# Patient Record
Sex: FEMALE | Race: WHITE | NOT HISPANIC OR LATINO | Employment: FULL TIME | URBAN - METROPOLITAN AREA
[De-identification: names, ages, dates, MRNs, and addresses within clinical notes are randomized per-mention and may not be internally consistent; named-entity substitution may affect disease eponyms.]

---

## 2017-06-18 ENCOUNTER — APPOINTMENT (OUTPATIENT)
Dept: ULTRASOUND IMAGING | Facility: CLINIC | Age: 22
End: 2017-06-18
Attending: EMERGENCY MEDICINE
Payer: COMMERCIAL

## 2017-06-18 ENCOUNTER — HOSPITAL ENCOUNTER (EMERGENCY)
Facility: CLINIC | Age: 22
Discharge: HOME | End: 2017-06-19
Attending: EMERGENCY MEDICINE | Admitting: EMERGENCY MEDICINE
Payer: COMMERCIAL

## 2017-06-18 DIAGNOSIS — O20.0 THREATENED MISCARRIAGE IN EARLY PREGNANCY: ICD-10-CM

## 2017-06-18 LAB
ABO + RH BLD: NORMAL
ABO + RH BLD: NORMAL
ANION GAP SERPL CALCULATED.3IONS-SCNC: 8 MMOL/L (ref 3–14)
B-HCG SERPL-ACNC: ABNORMAL IU/L (ref 0–5)
BLD GP AB SCN SERPL QL: NORMAL
BLOOD BANK CMNT PATIENT-IMP: NORMAL
BLOOD BANK CMNT PATIENT-IMP: NORMAL
BUN SERPL-MCNC: 6 MG/DL (ref 7–30)
CALCIUM SERPL-MCNC: 7.9 MG/DL (ref 8.5–10.1)
CHLORIDE SERPL-SCNC: 108 MMOL/L (ref 94–109)
CO2 SERPL-SCNC: 23 MMOL/L (ref 20–32)
CREAT SERPL-MCNC: 0.52 MG/DL (ref 0.52–1.04)
GFR SERPL CREATININE-BSD FRML MDRD: ABNORMAL ML/MIN/1.7M2
GLUCOSE SERPL-MCNC: 89 MG/DL (ref 70–99)
HGB BLD-MCNC: 12.7 G/DL (ref 11.7–15.7)
POTASSIUM SERPL-SCNC: 3.6 MMOL/L (ref 3.4–5.3)
SODIUM SERPL-SCNC: 139 MMOL/L (ref 133–144)
SPECIMEN EXP DATE BLD: NORMAL

## 2017-06-18 PROCEDURE — 86850 RBC ANTIBODY SCREEN: CPT | Performed by: EMERGENCY MEDICINE

## 2017-06-18 PROCEDURE — 85018 HEMOGLOBIN: CPT | Performed by: EMERGENCY MEDICINE

## 2017-06-18 PROCEDURE — 86900 BLOOD TYPING SEROLOGIC ABO: CPT | Performed by: EMERGENCY MEDICINE

## 2017-06-18 PROCEDURE — 80048 BASIC METABOLIC PNL TOTAL CA: CPT | Performed by: EMERGENCY MEDICINE

## 2017-06-18 PROCEDURE — 96361 HYDRATE IV INFUSION ADD-ON: CPT

## 2017-06-18 PROCEDURE — 96374 THER/PROPH/DIAG INJ IV PUSH: CPT

## 2017-06-18 PROCEDURE — 76801 OB US < 14 WKS SINGLE FETUS: CPT

## 2017-06-18 PROCEDURE — 25000128 H RX IP 250 OP 636: Performed by: EMERGENCY MEDICINE

## 2017-06-18 PROCEDURE — 85461 HEMOGLOBIN FETAL: CPT | Performed by: EMERGENCY MEDICINE

## 2017-06-18 PROCEDURE — 86901 BLOOD TYPING SEROLOGIC RH(D): CPT | Performed by: EMERGENCY MEDICINE

## 2017-06-18 PROCEDURE — 99285 EMERGENCY DEPT VISIT HI MDM: CPT | Mod: 25

## 2017-06-18 PROCEDURE — 84702 CHORIONIC GONADOTROPIN TEST: CPT | Performed by: EMERGENCY MEDICINE

## 2017-06-18 RX ORDER — METOCLOPRAMIDE HYDROCHLORIDE 5 MG/ML
5 INJECTION INTRAMUSCULAR; INTRAVENOUS ONCE
Status: COMPLETED | OUTPATIENT
Start: 2017-06-18 | End: 2017-06-18

## 2017-06-18 RX ORDER — SODIUM CHLORIDE 9 MG/ML
1000 INJECTION, SOLUTION INTRAVENOUS CONTINUOUS
Status: DISCONTINUED | OUTPATIENT
Start: 2017-06-18 | End: 2017-06-19 | Stop reason: HOSPADM

## 2017-06-18 RX ORDER — LIDOCAINE 40 MG/G
CREAM TOPICAL
Status: DISCONTINUED | OUTPATIENT
Start: 2017-06-18 | End: 2017-06-19 | Stop reason: HOSPADM

## 2017-06-18 RX ADMIN — METOCLOPRAMIDE 5 MG: 5 INJECTION, SOLUTION INTRAMUSCULAR; INTRAVENOUS at 22:47

## 2017-06-18 RX ADMIN — SODIUM CHLORIDE 1000 ML: 9 INJECTION, SOLUTION INTRAVENOUS at 22:45

## 2017-06-18 NOTE — ED AVS SNAPSHOT
[facility] Emergency Department    [address] [place] [address]    Phone:  [phone]    Fax:  [phone]                                       Katrina Ward   MRN: [id number]    Department:  [facility] Emergency Department   Date of Visit:  6/18/2017           Patient Information     Date Of Birth          1995        Your diagnoses for this visit were:     Threatened miscarriage in early pregnancy        You were seen by Andrey Valadez MD.        Discharge Instructions       Please see your OB in the next 3 days for a recheck.    Return to the Emergency Room if you develop more vaginal bleeding (soaking one pad an hour), lightheadedness, abdominal pain or if you have any new concerns about your health.        It was my pleasure to take care of you today. Thanks for visiting [facility]   Emergency Room.     Andrey Valadez MD    Vaginal Bleeding in Early Pregnancy:     During early pregnancy (first three months), it is not uncommon to have a small amount of bleeding. This can be entirely normal. But heavy bleeding or severe cramping can be an early sign of miscarriage. A  miscarriage  means unexpected loss of your pregnancy.  In about half of patients with bleeding or cramping during early pregnancy, these symptoms will stop and the pregnancy will continue normally. However, half of the time a miscarriage will occur. A miscarriage may occur due to various causes. These include a problem with the baby s chromosomes (genes that carry the information needed for life) or with fertilization or implantation that didn t happen correctly. In most cases no cause can be found. Be reassured that this is not the result of anything that you did wrong, and it will not interfere with your ability to become pregnant in the future    Home Care:  To improve the chance of keeping this pregnancy, you should do the following:    Rest in bed until the pain and  bleeding stop.    Do not have sexual intercourse for the next 3 weeks.    Use sanitary napkins instead of tampons.    Do not douche.  Follow-Up:  Make an appointment with your doctor within the next week, or as directed by our staff.  Note: If you had an ultrasound, it will be reviewed by a specialist. You will be notified of any new findings that may affect your care.  Get Prompt Medical Attention  if any of the following occur:    Vaginal bleeding or pain for more than three days    Heavy bleeding (soaking one new pad an hour over three hours)    Fever of 100.4 F (38 C) or higher, or as directed by your healthcare provider    Increasing lower abdominal pain    Weakness, dizziness, or fainting    Passage of anything that resembles tissue: pink or grayish membrane or solid material (save the tissue in a clean container and bring to the doctor)      24 Hour Appointment Hotline       To make an appointment at any [facility], call [phone] ([phone]). If you don't have a family doctor or clinic, we will help you find one. [facility] are conveniently located to serve the needs of you and your family.             Review of your medicines      Notice     You have not been prescribed any medications.            Procedures and tests performed during your visit     ABO/Rh type and screen    Basic metabolic panel    HCG QUANTitative pregnancy    Hemoglobin    Peripheral IV catheter    Rho (D) immune globulin (RhoGam) Lab Study    US OB < 14 Weeks w Transvaginal      Orders Needing Specimen Collection     None      Pending Results     No orders found for last 3 day(s).            Pending Culture Results     No orders found for last 3 day(s).            Pending Results Instructions     If you had any lab results that were not finalized at the time of your Discharge, you can call the ED Lab Result RN at [phone]. You will be contacted by this team for any positive Lab results or changes in treatment.  The nurses are available 7 days a week from 10A to 6:30P.  You can leave a message 24 hours per day and they will return your call.        Test Results From Your Hospital Stay        6/18/2017 10:52 PM      Component Results     Component Value Ref Range & Units Status    Hemoglobin 12.7 11.7 - 15.7 g/dL Final         6/18/2017 11:13 PM      Component Results     Component Value Ref Range & Units Status    Sodium 139 133 - 144 mmol/L Final    Potassium 3.6 3.4 - 5.3 mmol/L Final    Chloride 108 94 - 109 mmol/L Final    Carbon Dioxide 23 20 - 32 mmol/L Final    Anion Gap 8 3 - 14 mmol/L Final    Glucose 89 70 - 99 mg/dL Final    Urea Nitrogen 6 (L) 7 - 30 mg/dL Final    Creatinine 0.52 0.52 - 1.04 mg/dL Final    GFR Estimate >90  Non  GFR Calc   >60 mL/min/1.7m2 Final    GFR Estimate If Black >90   GFR Calc   >60 mL/min/1.7m2 Final    Calcium 7.9 (L) 8.5 - 10.1 mg/dL Final         6/18/2017 11:27 PM      Component Results     Component Value Ref Range & Units Status    HCG Quantitative Serum 52102 (H) 0 - 5 IU/L Final    Specimen run with a dilution         6/18/2017 10:51 PM      Component Results     Component    Rhogam Order    Order received   See Rhogam Study/Suitability           6/18/2017 11:30 PM      Component Results     Component    ABO    AB    RH(D)     Pos    Antibody Screen    Neg    Test Valid Only At    [facility]    Specimen Expires    06/21/2017 6/19/2017 12:13 AM      Narrative     US OB <14 WEEKS WITH TRANSVAGINAL SINGLE 6/19/2017 12:00 AM     INDICATION: Vaginal bleeding - evaluate for threatened miscarriage,  ectopic.    COMPARISON: None.    FINDINGS: Transabdominal followed by endovaginal ultrasound to assess  for early pregnancy. There is an intrauterine gestational sac  measuring 0.9 cm corresponding to less than 5 weeks gestational age. A  yolk sac is seen. No fetal pole is identified at this point. A fetal  pole is not routinely seen  with a gestational sac of this size. Small  corpus luteal cyst right ovary. Left ovary is negative. No free fluid  or other acute findings.        Impression     IMPRESSION:  1. Intrauterine gestational sac with yolk sac, but no visible fetal  pole. No other acute findings.  2. This may be a normal early IUP of less than 5 weeks, but fetal  demise could not be excluded at this point.  3. Recommend short-term follow-up ultrasound to confirm live IUP.    ANNABELLA LOPEZ MD                      Clinical Quality Measure: Blood Pressure Screening     Your blood pressure was checked while you were in the emergency department today. The last reading we obtained was  BP: 128/80 . Please read the guidelines below about what these numbers mean and what you should do about them.  If your systolic blood pressure (the top number) is less than 120 and your diastolic blood pressure (the bottom number) is less than 80, then your blood pressure is normal. There is nothing more that you need to do about it.  If your systolic blood pressure (the top number) is 120-139 or your diastolic blood pressure (the bottom number) is 80-89, your blood pressure may be higher than it should be. You should have your blood pressure rechecked within a year by a primary care provider.  If your systolic blood pressure (the top number) is 140 or greater or your diastolic blood pressure (the bottom number) is 90 or greater, you may have high blood pressure. High blood pressure is treatable, but if left untreated over time it can put you at risk for heart attack, stroke, or kidney failure. You should have your blood pressure rechecked by a primary care provider within the next 4 weeks.  If your provider in the emergency department today gave you specific instructions to follow-up with your doctor or provider even sooner than that, you should follow that instruction and not wait for up to 4 weeks for your follow-up visit.        Thank you for choosing  [facility]       Thank you for choosing [facility] for your care. Our goal is always to provide you with excellent care. Hearing back from our patients is one way we can continue to improve our services. Please take a few minutes to complete the written survey that you may receive in the mail after you visit with us. Thank you!        NSChart Information     [facility] gives you secure access to your electronic health record. If you see a primary care provider, you can also send messages to your care team and make appointments. If you have questions, please call your primary care clinic.  If you do not have a primary care provider, please call [phone] and they will assist you.        Care EveryWhere ID     This is your Care EveryWhere ID. This could be used by other organizations to access your [facility] medical records  [id number]        After Visit Summary       This is your record. Keep this with you and show to your community pharmacist(s) and doctor(s) at your next visit.

## 2017-06-18 NOTE — ED AVS SNAPSHOT
[facility] Emergency Department    [address]    [facility] [address]    Phone:  [phone]    Fax:  [phone]                                       Katrina Ward   MRN: [id number]    Department:  [facility] Emergency Department   Date of Visit:  6/18/2017           After Visit Summary Signature Page     I have received my discharge instructions, and my questions have been answered. I have discussed any challenges I see with this plan with the nurse or doctor.    ..........................................................................................................................................  Patient/Patient Representative Signature      ..........................................................................................................................................  Patient Representative Print Name and Relationship to Patient    ..................................................               ................................................  Date                                            Time    ..........................................................................................................................................  Reviewed by Signature/Title    ...................................................              ..............................................  Date                                                            Time

## 2017-06-19 VITALS
RESPIRATION RATE: 18 BRPM | WEIGHT: 160 LBS | HEART RATE: 85 BPM | DIASTOLIC BLOOD PRESSURE: 73 MMHG | TEMPERATURE: 97.4 F | OXYGEN SATURATION: 99 % | BODY MASS INDEX: 29.44 KG/M2 | SYSTOLIC BLOOD PRESSURE: 126 MMHG | HEIGHT: 62 IN

## 2017-06-19 NOTE — ED PROVIDER NOTES
"[facility] Emergency Medicine Note:    History     Chief Complaint:  Vaginal Bleeding      HPI: Katrina Ward is a 21 year old female who presents for evaluation of the above.  She notes she had vaginal bleeding starting today.  She has is currently pregnant around 6 weeks she believes.  The patient notes she is had intermittent lower abdominal pain but this is mild.  The vomitus without blood she is had around one hour of vomiting and then it has ceased there's been no diarrhea.  She has not had an ultrasound yet this pregnancy.  There've been no fevers chills, dysuria, increased frequency of urination or other new symptoms.      Allergies:  No Known Allergies    Medications:      No current outpatient prescriptions on file.    Problem List:    Patient Active Problem List    Diagnosis Date Noted     Indication for care in labor or delivery 07/01/2015       Past Medical History:    Past Medical History:   Diagnosis Date     Pre-eclampsia      Scoliosis      Substance abuse        Past Surgical History:    No past surgical history on file.    Family History:    No family history on file.    Social History:  Social History   Substance Use Topics     Smoking status: Current Some Day Smoker     Smokeless tobacco: Never Used     Alcohol use Yes       Review of Systems  See HPI, a 10 point review of systems was performed and is otherwise negative except as noted in HPI.     Physical Exam   Vital signs:  Patient Vitals for the past 24 hrs:   BP Temp Temp src Pulse Resp SpO2 Height Weight   06/18/17 2152 128/80 97.4  F (36.3  C) Temporal 85 18 99 % 1.575 m (5' 2\") 72.6 kg (160 lb)       Physical Exam  General: Patient is alert and interactive when I enter the room  Head:  The scalp, face, and head appear normal  Eyes:  The pupils are equal, round, and reactive to light    Conjunctivae and sclerae are normal  ENT:    External acoustic canals are normal    The oropharynx is normal without erythema.     Uvula is in the " midline  Neck:  Normal range of motion  CV:  Regular rate. S1/S2. No murmurs.   Resp:  Lungs are clear without wheezes or rales. No distress  GI:  Abdomen is soft, no rigidity, guarding, or rebound    No distension. No tenderness to palpation in any quadrant.     MS:  Normal tone. Joints grossly normal without effusions.     No asymmetric leg swelling, calf or thigh tenderness.      Normal motor assessment of all extremities.  Skin:  No rash or lesions noted. Normal capillary refill noted  Neuro:  Speech is normal and fluent. Face is symmetric.     Moving all extremities well.   Psych:  Awake. Alert.  Normal affect.  Appropriate interactions.  Lymph: No anterior cervical lymphadenopathy noted        Emergency Department Course   Imaging:  US OB < 14 Weeks w Transvaginal   Final Result   IMPRESSION:   1. Intrauterine gestational sac with yolk sac, but no visible fetal   pole. No other acute findings.   2. This may be a normal early IUP of less than 5 weeks, but fetal   demise could not be excluded at this point.   3. Recommend short-term follow-up ultrasound to confirm live IUP.      ANNABELLA LOPEZ MD          Laboratory:  HCG Qualitative Pregnancy : 37057  ABO/Rh type and screen: AB Pos, Antibody Screen Neg.  BMP: BUN 6, Calcium 7.9 o/w WNL. (Creatinine 0.52)  Hemoglobin  Peripheral IV catheter  Rhogam order    Interventions:  22:45 Normal saline 1,000mL IV   22:47 Reglan 5 mg IV  Medications   Blood Bank will determine if patient is eligible for and the proper dosage of Rho (D) immune globulin (RhoGam) (not administered)   lidocaine 1 % 1 mL (not administered)   lidocaine (LMX4) kit (not administered)   sodium chloride (PF) 0.9% PF flush 3 mL (not administered)   sodium chloride (PF) 0.9% PF flush 3 mL (not administered)   0.9% sodium chloride BOLUS (0 mLs Intravenous Stopped 6/19/17 0007)     Followed by   0.9% sodium chloride infusion (not administered)   NO Rho (D)  immune globulin (RhoGam) needed  - mother  INELIGIBLE (not administered)   NO Rho (D)  immune globulin (RhoGam) needed  - mother INELIGIBLE (not administered)   metoclopramide (REGLAN) injection 5 mg (5 mg Intravenous Given 6/18/17 6857)     Emergency Department Course:  Nursing notes and vitals reviewed.  I performed an exam of the patient as documented above.   Blood drawn. This was sent to the lab for further testing, results above.  The patient was taken for ultrasound, see imaging results above.   The patient received the intervention(s) above.  Recheck patient. Findings and plan explained to the Patient. Patient discharged home with instructions regarding supportive care, medications, and reasons to return. The importance of close follow-up was reviewed.  See above for meds/radiology/procedures intervention    Impression & Plan    Medical Decision Making:  Katrina Ward is a 21 year old female who presents for evaluation of vaginal spotting.  The workup here shows threatened miscarriage.  Her quant is high enough that I would expect to see fetal pole so I am suspicious about miscarriage. There is not a subchorionic hemorrhage.   I considered a broad differential including ovarian cyst, UTI, pyelonephritis, subchorionic hemorrhage, uterine bleeding, active miscarriage, constipation, etc.  More rare but serious etiologies considered included ectopic pregnancy, appendicitis, cholecystitis, volvulus, intraabdominal abscess, heterotopic pregnancy, etc.  In this patient, there are no signs of serious etiologies of abdominal pain.  Supportive outpatient management is therefore indicated.  Plan is home, close follow-up with OB, threatened miscarriage precautions, and return to ED for worsening pain, heavy vaginal bleeding (more than 1 pad soaked every hour).  Questions were answered.        Diagnosis:    ICD-10-CM    1. Threatened miscarriage in early pregnancy O20.0        Disposition:  discharged to home    Discharge Medications:  New Prescriptions    No  medications on file         Andrey Valadez MD  4/1/2017   [facility] EMERGENCY DEPARTMENT         Andrey Valadez MD  06/19/17 0034

## 2017-06-19 NOTE — ED NOTES
Pt presents to ED for evaluation of vomiting, dizziness and one episode of vaginal bleeding that has since resolved.  Pt states she is pregnant, but just found out and is unsure how far along she is in the pregnancy.

## 2017-06-19 NOTE — DISCHARGE INSTRUCTIONS
Please see your OB in the next 3 days for a recheck.    Return to the Emergency Room if you develop more vaginal bleeding (soaking one pad an hour), lightheadedness, abdominal pain or if you have any new concerns about your health.        It was my pleasure to take care of you today. Thanks for visiting [facility]   Emergency Room.     Andrey Valadez MD    Vaginal Bleeding in Early Pregnancy:     During early pregnancy (first three months), it is not uncommon to have a small amount of bleeding. This can be entirely normal. But heavy bleeding or severe cramping can be an early sign of miscarriage. A  miscarriage  means unexpected loss of your pregnancy.  In about half of patients with bleeding or cramping during early pregnancy, these symptoms will stop and the pregnancy will continue normally. However, half of the time a miscarriage will occur. A miscarriage may occur due to various causes. These include a problem with the baby s chromosomes (genes that carry the information needed for life) or with fertilization or implantation that didn t happen correctly. In most cases no cause can be found. Be reassured that this is not the result of anything that you did wrong, and it will not interfere with your ability to become pregnant in the future    Home Care:  To improve the chance of keeping this pregnancy, you should do the following:    Rest in bed until the pain and bleeding stop.    Do not have sexual intercourse for the next 3 weeks.    Use sanitary napkins instead of tampons.    Do not douche.  Follow-Up:  Make an appointment with your doctor within the next week, or as directed by our staff.  Note: If you had an ultrasound, it will be reviewed by a specialist. You will be notified of any new findings that may affect your care.  Get Prompt Medical Attention  if any of the following occur:    Vaginal bleeding or pain for more than three days    Heavy bleeding (soaking one new pad an hour over three  hours)    Fever of 100.4 F (38 C) or higher, or as directed by your healthcare provider    Increasing lower abdominal pain    Weakness, dizziness, or fainting    Passage of anything that resembles tissue: pink or grayish membrane or solid material (save the tissue in a clean container and bring to the doctor)

## 2017-08-16 ENCOUNTER — HOSPITAL ENCOUNTER (EMERGENCY)
Facility: CLINIC | Age: 22
Discharge: HOME | End: 2017-08-16
Attending: EMERGENCY MEDICINE | Admitting: EMERGENCY MEDICINE
Payer: COMMERCIAL

## 2017-08-16 ENCOUNTER — APPOINTMENT (OUTPATIENT)
Dept: ULTRASOUND IMAGING | Facility: CLINIC | Age: 22
End: 2017-08-16
Attending: EMERGENCY MEDICINE
Payer: COMMERCIAL

## 2017-08-16 VITALS
OXYGEN SATURATION: 100 % | HEART RATE: 86 BPM | SYSTOLIC BLOOD PRESSURE: 118 MMHG | DIASTOLIC BLOOD PRESSURE: 87 MMHG | RESPIRATION RATE: 20 BRPM | TEMPERATURE: 98.5 F

## 2017-08-16 DIAGNOSIS — N30.00 ACUTE CYSTITIS WITHOUT HEMATURIA: ICD-10-CM

## 2017-08-16 DIAGNOSIS — O20.0 THREATENED ABORTION: ICD-10-CM

## 2017-08-16 LAB
ALBUMIN UR-MCNC: NEGATIVE MG/DL
ANION GAP SERPL CALCULATED.3IONS-SCNC: 8 MMOL/L (ref 3–14)
APPEARANCE UR: ABNORMAL
B-HCG SERPL-ACNC: ABNORMAL IU/L (ref 0–5)
BACTERIA #/AREA URNS HPF: ABNORMAL /HPF
BASOPHILS # BLD AUTO: 0 10E9/L (ref 0–0.2)
BASOPHILS NFR BLD AUTO: 0.1 %
BILIRUB UR QL STRIP: NEGATIVE
BUN SERPL-MCNC: 5 MG/DL (ref 7–30)
CALCIUM SERPL-MCNC: 8.1 MG/DL (ref 8.5–10.1)
CHLORIDE SERPL-SCNC: 108 MMOL/L (ref 94–109)
CO2 SERPL-SCNC: 23 MMOL/L (ref 20–32)
COLOR UR AUTO: YELLOW
CREAT SERPL-MCNC: 0.5 MG/DL (ref 0.52–1.04)
DIFFERENTIAL METHOD BLD: NORMAL
EOSINOPHIL # BLD AUTO: 0 10E9/L (ref 0–0.7)
EOSINOPHIL NFR BLD AUTO: 0.4 %
ERYTHROCYTE [DISTWIDTH] IN BLOOD BY AUTOMATED COUNT: 13 % (ref 10–15)
GFR SERPL CREATININE-BSD FRML MDRD: >90 ML/MIN/1.7M2
GLUCOSE SERPL-MCNC: 91 MG/DL (ref 70–99)
GLUCOSE UR STRIP-MCNC: NEGATIVE MG/DL
HCT VFR BLD AUTO: 36 % (ref 35–47)
HGB BLD-MCNC: 12.4 G/DL (ref 11.7–15.7)
HGB UR QL STRIP: NEGATIVE
IMM GRANULOCYTES # BLD: 0.1 10E9/L (ref 0–0.4)
IMM GRANULOCYTES NFR BLD: 0.6 %
KETONES UR STRIP-MCNC: NEGATIVE MG/DL
LEUKOCYTE ESTERASE UR QL STRIP: ABNORMAL
LYMPHOCYTES # BLD AUTO: 1.7 10E9/L (ref 0.8–5.3)
LYMPHOCYTES NFR BLD AUTO: 19.6 %
MCH RBC QN AUTO: 31.7 PG (ref 26.5–33)
MCHC RBC AUTO-ENTMCNC: 34.4 G/DL (ref 31.5–36.5)
MCV RBC AUTO: 92 FL (ref 78–100)
MONOCYTES # BLD AUTO: 0.7 10E9/L (ref 0–1.3)
MONOCYTES NFR BLD AUTO: 8 %
MUCOUS THREADS #/AREA URNS LPF: PRESENT /LPF
NEUTROPHILS # BLD AUTO: 6.3 10E9/L (ref 1.6–8.3)
NEUTROPHILS NFR BLD AUTO: 71.3 %
NITRATE UR QL: NEGATIVE
NRBC # BLD AUTO: 0 10*3/UL
NRBC BLD AUTO-RTO: 0 /100
PH UR STRIP: 7 PH (ref 5–7)
PLATELET # BLD AUTO: 204 10E9/L (ref 150–450)
POTASSIUM SERPL-SCNC: 3.6 MMOL/L (ref 3.4–5.3)
RBC # BLD AUTO: 3.91 10E12/L (ref 3.8–5.2)
RBC #/AREA URNS AUTO: 3 /HPF (ref 0–2)
SODIUM SERPL-SCNC: 139 MMOL/L (ref 133–144)
SOURCE: ABNORMAL
SP GR UR STRIP: 1.02 (ref 1–1.03)
SQUAMOUS #/AREA URNS AUTO: 14 /HPF (ref 0–1)
UROBILINOGEN UR STRIP-MCNC: 0 MG/DL (ref 0–2)
WBC # BLD AUTO: 8.9 10E9/L (ref 4–11)
WBC #/AREA URNS AUTO: 22 /HPF (ref 0–2)

## 2017-08-16 PROCEDURE — 99284 EMERGENCY DEPT VISIT MOD MDM: CPT | Mod: 25

## 2017-08-16 PROCEDURE — 85025 COMPLETE CBC W/AUTO DIFF WBC: CPT | Performed by: EMERGENCY MEDICINE

## 2017-08-16 PROCEDURE — 84702 CHORIONIC GONADOTROPIN TEST: CPT | Performed by: EMERGENCY MEDICINE

## 2017-08-16 PROCEDURE — 81001 URINALYSIS AUTO W/SCOPE: CPT | Performed by: EMERGENCY MEDICINE

## 2017-08-16 PROCEDURE — 80048 BASIC METABOLIC PNL TOTAL CA: CPT | Performed by: EMERGENCY MEDICINE

## 2017-08-16 PROCEDURE — 76815 OB US LIMITED FETUS(S): CPT

## 2017-08-16 RX ORDER — CEPHALEXIN 500 MG/1
500 CAPSULE ORAL 4 TIMES DAILY
Qty: 28 CAPSULE | Refills: 0 | Status: SHIPPED | OUTPATIENT
Start: 2017-08-16 | End: 2017-08-23

## 2017-08-16 ASSESSMENT — ENCOUNTER SYMPTOMS: FREQUENCY: 1

## 2017-08-16 NOTE — ED AVS SNAPSHOT
[facility] Emergency Department    [address]    [facility] [address]    Phone:  [phone]    Fax:  [phone]                                       Katrina Ward   MRN: [id number]    Department:  [facility] Emergency Department   Date of Visit:  2017           Patient Information     Date Of Birth          1995        Your diagnoses for this visit were:     Threatened      Acute cystitis without hematuria        You were seen by Bennie Quintanilla MD.      Follow-up Information     Follow up with ob/gyn.    Why:  for re-evaluation of your symptoms        Discharge Instructions       Discharge Instructions  Urinary Tract Infection  You or your child have been diagnosed with a urinary tract infection, or UTI. The urinary tract includes the kidneys (which make urine/pee), ureters (the tubes that carry urine/pee from the kidneys to the bladder), the bladder (which stores urine/pee), and urethra (the tube that carries urine/pee out of the bladder). Urinary tract infections occur when bacteria travel up the urethra into the bladder (bladder infection) and, in some cases, from there into the kidneys (kidney infection).  Generally, every Emergency Department visit should have a follow-up clinic visit with either a primary or a specialty clinic/provider. Please follow-up as instructed by your emergency provider today.  Return to the Emergency Department if:    You or your child have severe back pain.    You or your child are vomiting (throwing up) so that you cannot take your medicine.    You or your child have a new fever (had not previously had a fever) over 101 F.    You or your child have confusion or are very weak, or feel very ill.    Your child seems much more ill, will not wake up, will not respond right, or is crying for a long time and will not calm down.    You or your child are showing signs of dehydration. These signs may include decreased  urination (pee), dry mouth/gums/tongue, or decreased activity.    Follow-up with your provider:     Children under 24 months need to be seen by their regular provider within one week after a diagnosis of a UTI. It may be necessary to do some more tests to look at the child s kidney or bladder.    You should begin to feel better within 24 - 48 hours of starting your antibiotic; follow-up with your regular clinic/doctor/provider if this is not the case.    Treatment:     You will be treated with an antibiotic to kill the bacteria. We have to make an educated guess, based on what we know about common bacteria and antibiotics, as to which antibiotic will work for your infection. We will be correct most times but there will be some cases where the antibiotic chosen is not correct (see urine cultures below).    Take a pain medication such as acetaminophen (Tylenol ) or ibuprofen (Advil , Motrin , Nuprin ).    Phenazopyridine (Pyridium , Uristat ) is a prescription medication that numbs the bladder to reduce the burning pain of some UTIs.  The same medication is available in a non-prescription version (Azo-Standard , Urodol ). This medication will change the color of the urine and tears (usually blue or orange). If you wear contacts, do not wear them while taking this medication as they may be stained by the medication.    Urine Cultures:    If indicated, a urine culture may have been performed today. This test generally takes 24-48 hours to complete so the results are not known at this time. The results can confirm that an infection is present but also determine which antibiotic is effective for the specific bacteria that is causing the infection. If your urine culture shows that the antibiotic you were given today will not work to treat your infection, we will attempt to contact you to make arrangements to change the antibiotic. If the culture confirms that the antibiotic is effective for your infection, you will not be  "contacted. We often recommend follow-up with your regular physician/provider on the culture results regardless of this process.    Antibiotic Warning:     If you have been placed on antibiotics - watch for signs of allergic reaction.  These include rash, lip swelling, difficulty breathing, wheezing, and dizziness.  If you develop any of these symptoms, stop the antibiotic immediately and go to an emergency room or urgent care for evaluation.    Probiotics: If you have been given an antibiotic, you may want to also take a probiotic pill or eat yogurt with live cultures. Probiotics have \"good bacteria\" to help your intestines stay healthy. Studies have shown that probiotics help prevent diarrhea and other intestine problems (including C. diff infection) when you take antibiotics. You can buy these without a prescription in the pharmacy section of the store.   If you were given a prescription for medicine here today, be sure to read all of the information (including the package insert) that comes with your prescription.  This will include important information about the medicine, its side effects, and any warnings that you need to know about.  The pharmacist who fills the prescription can provide more information and answer questions you may have about the medicine.  If you have questions or concerns that the pharmacist cannot address, please call or return to the Emergency Department.   Remember that you can always come back to the Emergency Department if you are not able to see your regular provider in the amount of time listed above, if you get any new symptoms, or if there is anything that worries you.      Possible Miscarriage (Threatened )  You may be having a miscarriage.  Common signs of a miscarriage are pain and bleeding. A small amount of bleeding can be normal during the first 3 months of pregnancy. Often the pain and bleeding stop, and you have a normal pregnancy and baby. But heavy bleeding or " severe cramping can be an early sign of miscarriage. A miscarriage means an unexpected loss of your pregnancy.  At this time, your healthcare provider doesn t know whether you will have a miscarriage, or if things will clear up and your pregnancy will continue normally. This can be emotionally difficult. There is little that can be done to change the way you feel. But understand that miscarriages are common.  About 1 or 2 out of every 10 pregnancies end this way. Some even end before you know you are pregnant. This happens for a number of reasons, and usually the cause is never known. It s important you know that it is not your fault. It didn t happen because you did anything wrong.  Having sex or exercising does not cause a miscarriage. These activities are usually safe unless you have pain or bleeding or your doctor tells you to stop. Even minor falls won t cause a miscarriage. Miscarriages happen because things were not developing as they were supposed to. No medicine can prevent a miscarriage.  Again, understand that things are uncertain right now. You may still have some bleeding. This may be light spotting or like a period, and you may pass some tissue. You may have some cramping. This is why follow-up care is important.  Home care  To improve the chance of keeping your pregnancy, you should take these steps:    Rest in bed until the pain and bleeding stop.    Don t have sex until your healthcare provider says it s OK.    Use sanitary napkins instead of tampons.    Don t douche.    Don t take aspirin, ibuprofen, or naproxen.    Don t have alcoholic or caffeinated beverages or smoke.  Follow-up care  Make an appointment with your doctor within the next week, or as directed.  If you had an ultrasound, a radiologist will review it. You will be told of any new findings that may affect your care.  Call 911  Call 911 if you have:    Severe pain and very heavy bleeding    Severe lightheadedness, passing out, or  fainting    Rapid heart rate    Difficulty breathing    Confusion or difficulty waking up  When to seek medical advice  Call your healthcare provider right away if any of these occur:    Vaginal bleeding or pain that lasts for more than 3 days    Heavy bleeding. This means soaking 1 new pad an hour over 3 hours.    Fever of 100.4 F (38 C) or higher, or as directed by your healthcare provider    Pain in your lower belly (abdomen) that gets worse    Weakness or dizziness    Passage of anything that resembles tissue. This would be pink or grayish membrane or solid material. Save the tissue in a clean container and bring it to your provider.  Date Last Reviewed: 9/1/2016 2000-2017 [email or web address]. [address] [place] [address], [place] [address]. All rights reserved. This information is not intended as a substitute for professional medical care. Always follow your healthcare professional's instructions.          24 Hour Appointment Hotline       To make an appointment at any [facility], call [phone] ([phone]). If you don't have a family doctor or clinic, we will help you find one. [facility] clinics are conveniently located to serve the needs of you and your family.             Review of your medicines      START taking        Dose / Directions Last dose taken    cephALEXin 500 MG capsule   Commonly known as:  KEFLEX   Dose:  500 mg   Quantity:  28 capsule        Take 1 capsule (500 mg) by mouth 4 times daily for 7 days   Refills:  0                Prescriptions were sent or printed at these locations (1 Prescription)                   Other Prescriptions                Printed at Department/Unit printer (1 of 1)         cephALEXin (KEFLEX) 500 MG capsule                Procedures and tests performed during your visit     Basic metabolic panel    CBC with platelets differential    HCG QUANTitative pregnancy (blood)    Peripheral IV: Standard    UA with Microscopic    US OB Limited One Or More  Fetuses      Orders Needing Specimen Collection     None      Pending Results     Date and Time Order Name Status Description    8/16/2017 0155 US OB Limited One Or More Fetuses Preliminary             Pending Culture Results     No orders found from 8/14/2017 to 8/17/2017.            Pending Results Instructions     If you had any lab results that were not finalized at the time of your Discharge, you can call the ED Lab Result RN at [phone]. You will be contacted by this team for any positive Lab results or changes in treatment. The nurses are available 7 days a week from 10A to 6:30P.  You can leave a message 24 hours per day and they will return your call.        Test Results From Your Hospital Stay              8/16/2017  2:36 AM      Component Results     Component Value Ref Range & Units Status    WBC 8.9 4.0 - 11.0 10e9/L Final    RBC Count 3.91 3.8 - 5.2 10e12/L Final    Hemoglobin 12.4 11.7 - 15.7 g/dL Final    Hematocrit 36.0 35.0 - 47.0 % Final    MCV 92 78 - 100 fl Final    MCH 31.7 26.5 - 33.0 pg Final    MCHC 34.4 31.5 - 36.5 g/dL Final    RDW 13.0 10.0 - 15.0 % Final    Platelet Count 204 150 - 450 10e9/L Final    Diff Method Automated Method  Final    % Neutrophils 71.3 % Final    % Lymphocytes 19.6 % Final    % Monocytes 8.0 % Final    % Eosinophils 0.4 % Final    % Basophils 0.1 % Final    % Immature Granulocytes 0.6 % Final    Nucleated RBCs 0 0 /100 Final    Absolute Neutrophil 6.3 1.6 - 8.3 10e9/L Final    Absolute Lymphocytes 1.7 0.8 - 5.3 10e9/L Final    Absolute Monocytes 0.7 0.0 - 1.3 10e9/L Final    Absolute Eosinophils 0.0 0.0 - 0.7 10e9/L Final    Absolute Basophils 0.0 0.0 - 0.2 10e9/L Final    Abs Immature Granulocytes 0.1 0 - 0.4 10e9/L Final    Absolute Nucleated RBC 0.0  Final         8/16/2017  2:58 AM      Component Results     Component Value Ref Range & Units Status    Sodium 139 133 - 144 mmol/L Final    Potassium 3.6 3.4 - 5.3 mmol/L Final    Chloride 108 94 - 109 mmol/L  Final    Carbon Dioxide 23 20 - 32 mmol/L Final    Anion Gap 8 3 - 14 mmol/L Final    Glucose 91 70 - 99 mg/dL Final    Urea Nitrogen 5 (L) 7 - 30 mg/dL Final    Creatinine 0.50 (L) 0.52 - 1.04 mg/dL Final    GFR Estimate >90 >60 mL/min/1.7m2 Final    Non  GFR Calc    GFR Estimate If Black >90 >60 mL/min/1.7m2 Final    African American GFR Calc    Calcium 8.1 (L) 8.5 - 10.1 mg/dL Final         8/16/2017  3:25 AM      Component Results     Component Value Ref Range & Units Status    HCG Quantitative Serum 48775 (H) 0 - 5 IU/L Final    Specimen run with a dilution         8/16/2017  2:43 AM      Component Results     Component Value Ref Range & Units Status    Color Urine Yellow  Final    Appearance Urine Slightly Cloudy  Final    Glucose Urine Negative NEG^Negative mg/dL Final    Bilirubin Urine Negative NEG^Negative Final    Ketones Urine Negative NEG^Negative mg/dL Final    Specific Gravity Urine 1.017 1.003 - 1.035 Final    Blood Urine Negative NEG^Negative Final    pH Urine 7.0 5.0 - 7.0 pH Final    Protein Albumin Urine Negative NEG^Negative mg/dL Final    Urobilinogen mg/dL 0.0 0.0 - 2.0 mg/dL Final    Nitrite Urine Negative NEG^Negative Final    Leukocyte Esterase Urine Small (A) NEG^Negative Final    Source Midstream Urine  Final    WBC Urine 22 (H) 0 - 2 /HPF Final    RBC Urine 3 (H) 0 - 2 /HPF Final    Bacteria Urine Many (A) NEG^Negative /HPF Final    Squamous Epithelial /HPF Urine 14 (H) 0 - 1 /HPF Final    Mucous Urine Present (A) NEG^Negative /LPF Final         8/16/2017  3:46 AM      Narrative     US OB LIMITED ONE OR MORE FETUSES  8/16/2017 3:35 AM    HISTORY: Pregnancy with pain or bleeding.    COMPARISON: 6/18/2017.    FINDINGS:   Presentation: Breech.  Cardiac activity: 165 bpm. Regular rhythm.  Movement: Unremarkable.  Placenta: Anterior. No evidence for placenta previa.  Adnexa: Unremarkable.   Cervical length: 3.4 cm.   Amniotic fluid: Unremarkable.     Other findings: None.  A  complete anatomy scan was not performed.     Measured parameters:       BPD: 2.6 cm  Age: 14 weeks 5 days.       HC: 9.8 cm  Age: 14 weeks 4 days.       AC: 8.0 cm  Age: 14 weeks 3 days.       FL:   1.5 cm  Age: 14 weeks 3 days.    Gestational age by current ultrasound measurement: 14 weeks 5 days,  corresponding to an PATRICIA of 2/9/2018.    Estimated fetal weight: 97 grams.        Impression     IMPRESSION: Single live intrauterine pregnancy measuring 14 weeks 5  days gestational age. No abnormalities are evident.                Clinical Quality Measure: Blood Pressure Screening     Your blood pressure was checked while you were in the emergency department today. The last reading we obtained was  BP: 118/87 . Please read the guidelines below about what these numbers mean and what you should do about them.  If your systolic blood pressure (the top number) is less than 120 and your diastolic blood pressure (the bottom number) is less than 80, then your blood pressure is normal. There is nothing more that you need to do about it.  If your systolic blood pressure (the top number) is 120-139 or your diastolic blood pressure (the bottom number) is 80-89, your blood pressure may be higher than it should be. You should have your blood pressure rechecked within a year by a primary care provider.  If your systolic blood pressure (the top number) is 140 or greater or your diastolic blood pressure (the bottom number) is 90 or greater, you may have high blood pressure. High blood pressure is treatable, but if left untreated over time it can put you at risk for heart attack, stroke, or kidney failure. You should have your blood pressure rechecked by a primary care provider within the next 4 weeks.  If your provider in the emergency department today gave you specific instructions to follow-up with your doctor or provider even sooner than that, you should follow that instruction and not wait for up to 4 weeks for your follow-up  visit.        Thank you for choosing [facility]       Thank you for choosing [facility] for your care. Our goal is always to provide you with excellent care. Hearing back from our patients is one way we can continue to improve our services. Please take a few minutes to complete the written survey that you may receive in the mail after you visit with us. Thank you!        [facility] Information     [facility] gives you secure access to your electronic health record. If you see a primary care provider, you can also send messages to your care team and make appointments. If you have questions, please call your primary care clinic.  If you do not have a primary care provider, please call [phone] and they will assist you.        Care EveryWhere ID     This is your Care EveryWhere ID. This could be used by other organizations to access your [facility] medical records  [id number]        Equal Access to Services     COLE MOORE : Estefany Guy, tony stark, arlen berg. So [facility] [phone].    ATENCIÓN: Si habla español, tiene a mckinley disposición servicios gratuitos de asistencia lingüística. Llame al [phone].    We comply with applicable federal civil rights laws and [place] laws. We do not discriminate on the basis of race, color, national origin, age, disability sex, sexual orientation or gender identity.            After Visit Summary       This is your record. Keep this with you and show to your community pharmacist(s) and doctor(s) at your next visit.

## 2017-08-16 NOTE — ED NOTES
"Patient presents complaining of vaginal bleeding and cramping. She states she is pregnant \"I think about 4 months pregnant\", but has not received any prenatal care up to this point. She is alert and oriented, ABCs intact.  "

## 2017-08-16 NOTE — ED PROVIDER NOTES
"  History     Chief Complaint:  Vaginal Bleeding    HPI   Ktarina Ward is a  21 year old female who presents to the emergency department today for evaluation of vaginal bleeding. Per chart review, the patient was seen in the emergency department 2 months ago and was found to be pregnant. See ultrasound results below. After returning home, she had vaginal bleeding with clotting and \"assumed it was a full on miscarriage.\" A week ago, she thought she might be pregnant again so she took an at home pregnancy test that came back positive. Yesterday, the patient had cramping and today developed vaginal bleeding with clotting. Therefore she presents to the emergency department for evaluation of pregnancy status. An additional concern of the patient is a bladder infection due to decreased urine volume during urination as well as increase frequency. She denies urgency, but states she \"gets bladder infections a lot.\" Of note, the patient never followed up with OB following her  visit to the emergency department, but she has an appointment with OB on the  at [facility].    US OB <14 weeks with Transvaginal Single  IMPRESSION:  1. Intrauterine gestational sac with yolk sac, but no visible fetal  pole. No other acute findings.  2. This may be a normal early IUP of less than 5 weeks, but fetal  demise could not be excluded at this point.  3. Recommend short-term follow-up ultrasound to confirm live IUP.  Report per radiology     Allergies:  No Known Drug Allergies     Medications:    The patient is currently on no regular medications.    Past Medical History:    Pre-eclampsia  Scoliosis    Past Surgical History:    History reviewed. No pertinent past surgical history.    Family History:    History reviewed. No pertinent family history.     Social History:  The patient was accompanied to the ED by her family.  Smoking Status: Current  Smokeless Tobacco: Never  Alcohol Use: Yes  Marital Status:  Single   "   Review of Systems   Genitourinary: Positive for decreased urine volume, frequency and vaginal bleeding (with clotting). Negative for urgency.   All other systems reviewed and are negative.    Physical Exam   First Vitals:  /87  Pulse 86  Temp 98.5  F (36.9  C) (Temporal)  Resp 20  LMP 04/01/2017  SpO2 100%      Physical Exam  General: Patient is alert and cooperative.  HENT: normal nose, oropharynx.   Eyes: Normal conjunctiva.  Neck: Normal range of motion. Neck supple.  Cardiovascular: Normal rate.  Pulmonary/Chest: Effort normal.   Abdominal: Soft. Patient exhibits no distension and no mass. There is no tenderness.       Musculoskeletal: Normal range of motion. Patient exhibits no edema and no tenderness.   Neurological: Patient  is alert and oriented.  Skin: Skin is warm and dry. No rash noted.   Psychiatric: Patient has a normal mood and affect. Normal behavior and judgement.    Emergency Department Course     Imaging:  Radiology findings were communicated with the patient and family who voiced understanding of the findings.  US OB Limited One or More Fetuses  IMPRESSION: Single live intrauterine pregnancy measuring 14 weeks 5  days gestational age. No abnormalities are evident.  Report per radiology     Laboratory:  Laboratory findings were communicated with the patient and family who voiced understanding of the findings.  CBC: AWNL. (WBC 8.9, HGB 12.4, )   BMP: Creatinine 0.50 (L), BUN 5 (L), Calcium 8.1 (L), o/w WNL  HCG Quantitative pregnancy (blood): 67743 (H)  UA with Microscopic: Leukocyte Esterase Urine Small, WBC/HPF 22 (H), RBC/HPF 3(H), Bacteria Many, Squamous Epithelial /HPF Urine 14 (H), Mucous Urine Present, o/w WNL    Emergency Department Course:  Nursing notes and vitals reviewed.  IV was inserted and blood was drawn for laboratory testing, results above.  The patient was sent for a US OB Limited One Or More Fetuses while in the emergency department, results above.   The  patient provided a urine sample here in the emergency department. This was sent for laboratory testing, findings above.  0155: I performed an exam of the patient as documented above.  0440: Patient rechecked and updated.    I discussed the treatment plan with the patient. They expressed understanding of this plan and consented to discharge. They will be discharged home with instructions for care and follow up. In addition, the patient will return to the emergency department if their symptoms persist, worsen, if new symptoms arise or if there is any concern.  All questions were answered.  I personally reviewed the laboratory and imaging results with the Patient and family and answered all related questions prior to discharge.    Impression & Plan      Medical Decision Making:  Katrina Ward is an afebrile 21 year old female with acute abdominal cramping and vaginal spotting. She was seen in this ER in  with similar issues, was found to be pregnant and had pelvic ultrasound demonstrating an early IUP. She has not follow up with OBGYN since that time. She has a benign abdominal exam and workup this morning, which has included pelvic ultrasound showing an IUP at 14 weeks 5 days. There is no abnormality described. She has a normal CBC. Urinalysis showing pyuria and bacteremia. She is complaining of some very vague urine symptoms of frequency. She has no fevers or flank pain. Clinically, I've recommended treatment for acute cystitis and early OBGYN follow up. There is no indication for any additional testing at this time.    Diagnosis:    ICD-10-CM    1. Threatened  O20.0    2. Acute cystitis without hematuria N30.00      Disposition:  Discharged to home    Discharge Medications:  Discharge Medication List as of 2017  4:43 AM      START taking these medications    Details   cephALEXin (KEFLEX) 500 MG capsule Take 1 capsule (500 mg) by mouth 4 times daily for 7 days, Disp-28 capsule, R-0, Local Print            Scribe Disclosure:  I, Lo Simpson, am serving as a scribe at 1:55 AM on 8/16/2017 to document services personally performed by Bennie Quintanilla MD based on my observations and the provider's statements to me.     8/16/2017   [facility] EMERGENCY DEPARTMENT       Bennie Quintanilla MD  08/16/17 0236

## 2017-08-16 NOTE — DISCHARGE INSTRUCTIONS
Discharge Instructions  Urinary Tract Infection  You or your child have been diagnosed with a urinary tract infection, or UTI. The urinary tract includes the kidneys (which make urine/pee), ureters (the tubes that carry urine/pee from the kidneys to the bladder), the bladder (which stores urine/pee), and urethra (the tube that carries urine/pee out of the bladder). Urinary tract infections occur when bacteria travel up the urethra into the bladder (bladder infection) and, in some cases, from there into the kidneys (kidney infection).  Generally, every Emergency Department visit should have a follow-up clinic visit with either a primary or a specialty clinic/provider. Please follow-up as instructed by your emergency provider today.  Return to the Emergency Department if:    You or your child have severe back pain.    You or your child are vomiting (throwing up) so that you cannot take your medicine.    You or your child have a new fever (had not previously had a fever) over 101 F.    You or your child have confusion or are very weak, or feel very ill.    Your child seems much more ill, will not wake up, will not respond right, or is crying for a long time and will not calm down.    You or your child are showing signs of dehydration. These signs may include decreased urination (pee), dry mouth/gums/tongue, or decreased activity.    Follow-up with your provider:     Children under 24 months need to be seen by their regular provider within one week after a diagnosis of a UTI. It may be necessary to do some more tests to look at the child s kidney or bladder.    You should begin to feel better within 24 - 48 hours of starting your antibiotic; follow-up with your regular clinic/doctor/provider if this is not the case.    Treatment:     You will be treated with an antibiotic to kill the bacteria. We have to make an educated guess, based on what we know about common bacteria and antibiotics, as to which antibiotic will work  "for your infection. We will be correct most times but there will be some cases where the antibiotic chosen is not correct (see urine cultures below).    Take a pain medication such as acetaminophen (Tylenol ) or ibuprofen (Advil , Motrin , Nuprin ).    Phenazopyridine (Pyridium , Uristat ) is a prescription medication that numbs the bladder to reduce the burning pain of some UTIs.  The same medication is available in a non-prescription version (Azo-Standard , Urodol ). This medication will change the color of the urine and tears (usually blue or orange). If you wear contacts, do not wear them while taking this medication as they may be stained by the medication.    Urine Cultures:    If indicated, a urine culture may have been performed today. This test generally takes 24-48 hours to complete so the results are not known at this time. The results can confirm that an infection is present but also determine which antibiotic is effective for the specific bacteria that is causing the infection. If your urine culture shows that the antibiotic you were given today will not work to treat your infection, we will attempt to contact you to make arrangements to change the antibiotic. If the culture confirms that the antibiotic is effective for your infection, you will not be contacted. We often recommend follow-up with your regular physician/provider on the culture results regardless of this process.    Antibiotic Warning:     If you have been placed on antibiotics - watch for signs of allergic reaction.  These include rash, lip swelling, difficulty breathing, wheezing, and dizziness.  If you develop any of these symptoms, stop the antibiotic immediately and go to an emergency room or urgent care for evaluation.    Probiotics: If you have been given an antibiotic, you may want to also take a probiotic pill or eat yogurt with live cultures. Probiotics have \"good bacteria\" to help your intestines stay healthy. Studies have shown " that probiotics help prevent diarrhea and other intestine problems (including C. diff infection) when you take antibiotics. You can buy these without a prescription in the pharmacy section of the store.   If you were given a prescription for medicine here today, be sure to read all of the information (including the package insert) that comes with your prescription.  This will include important information about the medicine, its side effects, and any warnings that you need to know about.  The pharmacist who fills the prescription can provide more information and answer questions you may have about the medicine.  If you have questions or concerns that the pharmacist cannot address, please call or return to the Emergency Department.   Remember that you can always come back to the Emergency Department if you are not able to see your regular provider in the amount of time listed above, if you get any new symptoms, or if there is anything that worries you.      Possible Miscarriage (Threatened )  You may be having a miscarriage.  Common signs of a miscarriage are pain and bleeding. A small amount of bleeding can be normal during the first 3 months of pregnancy. Often the pain and bleeding stop, and you have a normal pregnancy and baby. But heavy bleeding or severe cramping can be an early sign of miscarriage. A miscarriage means an unexpected loss of your pregnancy.  At this time, your healthcare provider doesn t know whether you will have a miscarriage, or if things will clear up and your pregnancy will continue normally. This can be emotionally difficult. There is little that can be done to change the way you feel. But understand that miscarriages are common.  About 1 or 2 out of every 10 pregnancies end this way. Some even end before you know you are pregnant. This happens for a number of reasons, and usually the cause is never known. It s important you know that it is not your fault. It didn t happen because  you did anything wrong.  Having sex or exercising does not cause a miscarriage. These activities are usually safe unless you have pain or bleeding or your doctor tells you to stop. Even minor falls won t cause a miscarriage. Miscarriages happen because things were not developing as they were supposed to. No medicine can prevent a miscarriage.  Again, understand that things are uncertain right now. You may still have some bleeding. This may be light spotting or like a period, and you may pass some tissue. You may have some cramping. This is why follow-up care is important.  Home care  To improve the chance of keeping your pregnancy, you should take these steps:    Rest in bed until the pain and bleeding stop.    Don t have sex until your healthcare provider says it s OK.    Use sanitary napkins instead of tampons.    Don t douche.    Don t take aspirin, ibuprofen, or naproxen.    Don t have alcoholic or caffeinated beverages or smoke.  Follow-up care  Make an appointment with your doctor within the next week, or as directed.  If you had an ultrasound, a radiologist will review it. You will be told of any new findings that may affect your care.  Call 911  Call 911 if you have:    Severe pain and very heavy bleeding    Severe lightheadedness, passing out, or fainting    Rapid heart rate    Difficulty breathing    Confusion or difficulty waking up  When to seek medical advice  Call your healthcare provider right away if any of these occur:    Vaginal bleeding or pain that lasts for more than 3 days    Heavy bleeding. This means soaking 1 new pad an hour over 3 hours.    Fever of 100.4 F (38 C) or higher, or as directed by your healthcare provider    Pain in your lower belly (abdomen) that gets worse    Weakness or dizziness    Passage of anything that resembles tissue. This would be pink or grayish membrane or solid material. Save the tissue in a clean container and bring it to your provider.  Date Last Reviewed:  9/1/2016 2000-2017 [employer]. [address] [place] [address], [place] [address]. All rights reserved. This information is not intended as a substitute for professional medical care. Always follow your healthcare professional's instructions.

## 2017-08-16 NOTE — ED NOTES
Pt states she was seen here two months ago for vaginal bleeding and had clots and bleeding following her visit so she assumed she miscarried. Pt says she has missed her period the last two months though so took another pregnancy test recently and it was positive. Pt having bleeding and cramping since yesterday.

## 2017-08-16 NOTE — ED AVS SNAPSHOT
[facility] Emergency Department    [address]    [facility] [address]    Phone:  [phone]    Fax:  [phone]                                       Katrina Ward   MRN: [id number]    Department:  [facility] Emergency Department   Date of Visit:  8/16/2017           After Visit Summary Signature Page     I have received my discharge instructions, and my questions have been answered. I have discussed any challenges I see with this plan with the nurse or doctor.    ..........................................................................................................................................  Patient/Patient Representative Signature      ..........................................................................................................................................  Patient Representative Print Name and Relationship to Patient    ..................................................               ................................................  Date                                            Time    ..........................................................................................................................................  Reviewed by Signature/Title    ...................................................              ..............................................  Date                                                            Time

## 2017-08-23 LAB
HBV SURFACE AG SERPL QL IA: NONREACTIVE
HIV 1+2 AB+HIV1 P24 AG SERPL QL IA: NONREACTIVE
RUBELLA ABY IGG: NORMAL
T PALLIDUM IGG SER QL: NONREACTIVE

## 2017-09-04 ENCOUNTER — NURSE TRIAGE (OUTPATIENT)
Dept: NURSING | Facility: CLINIC | Age: 22
End: 2017-09-04

## 2017-09-04 NOTE — TELEPHONE ENCOUNTER
Reason for Disposition    [1] Caller requesting NON-URGENT health information AND [2] PCP's office is the best resource     Caller wanted to know appt time. I do not see any appt in [facility]. Advised to call PCP in am at 8 am for that information.    Additional Information    Negative: [1] Caller is not with the adult (patient) AND [2] reporting urgent symptoms    Negative: Lab result questions    Negative: Medication questions    Negative: Caller cannot be reached by phone    Negative: Caller has already spoken to PCP or another triager    Negative: RN needs further essential information from caller in order to complete triage    Negative: Requesting regular office appointment    Protocols used: INFORMATION ONLY CALL-ADULT-

## 2017-11-12 ENCOUNTER — HEALTH MAINTENANCE LETTER (OUTPATIENT)
Age: 22
End: 2017-11-12

## 2017-12-06 ENCOUNTER — HOSPITAL ENCOUNTER (OUTPATIENT)
Facility: CLINIC | Age: 22
Discharge: HOME | End: 2017-12-06
Attending: OBSTETRICS & GYNECOLOGY | Admitting: OBSTETRICS & GYNECOLOGY
Payer: COMMERCIAL

## 2017-12-06 VITALS — SYSTOLIC BLOOD PRESSURE: 128 MMHG | RESPIRATION RATE: 18 BRPM | DIASTOLIC BLOOD PRESSURE: 64 MMHG

## 2017-12-06 LAB — FIBRONECTIN FETAL VAG QL: NEGATIVE

## 2017-12-06 PROCEDURE — 82731 ASSAY OF FETAL FIBRONECTIN: CPT | Performed by: OBSTETRICS & GYNECOLOGY

## 2017-12-06 PROCEDURE — 99214 OFFICE O/P EST MOD 30 MIN: CPT

## 2017-12-06 PROCEDURE — 84460 ALANINE AMINO (ALT) (SGPT): CPT | Performed by: OBSTETRICS & GYNECOLOGY

## 2017-12-06 NOTE — PROVIDER NOTIFICATION
12/06/17 6909   Provider Notification   Provider Name/Title Dr. Valdovinos   Method of Notification Phone   Request Evaluate - Remote   Notification Reason Patient Arrived;Status Update;SVE     Dr. Valdovinos notified of patient's arrival, c/o not having a bowel movement for the past week. Also informed of previous pregnancy with GHTN that was treated with Magnesium Sulfate and delivery at 34 weeks gestation. Made aware of increased BP on admission and improving over the past hour. Patient has history of cocaine use last at 23 weeks gestation. Orders received for enema, PIH labs and UA with tox screen if able to obtain UA specimen. fFN obtained prior to cervical exam, order received to send to lab.

## 2017-12-06 NOTE — IP AVS SNAPSHOT
[facility] Labor and Delivery    [address]    [facility] [address]    Phone:  [phone]    Fax:  [phone]                                       After Visit Summary   12/6/2017    Katrina Ward    MRN: [id number]           After Visit Summary Signature Page     I have received my discharge instructions, and my questions have been answered. I have discussed any challenges I see with this plan with the nurse or doctor.    ..........................................................................................................................................  Patient/Patient Representative Signature      ..........................................................................................................................................  Patient Representative Print Name and Relationship to Patient    ..................................................               ................................................  Date                                            Time    ..........................................................................................................................................  Reviewed by Signature/Title    ...................................................              ..............................................  Date                                                            Time

## 2017-12-06 NOTE — DISCHARGE INSTRUCTIONS
Discharge Instruction for Undelivered Patients      You were seen for: cervical exam  We Consulted: Dr. Valdovinos  You had (Test or Medicine):fetal monitoring     Diet:   regular     Activity:  {Activity:8311849}     Call your provider if you notice:  Swelling in your face or increased swelling in your hands or legs.  Headaches that are not relieved by Tylenol (acetaminophen).  Changes in your vision (blurring: seeing spots or stars.)  Nausea (sick to your stomach) and vomiting (throwing up).   Weight gain of 5 pounds or more per week.  Heartburn that doesn't go away.  Signs of bladder infection: pain when you urinate (use the toilet), need to go more often and more urgently.  The bag of justice (rupture of membranes) breaks, or you notice leaking in your underwear.  Bright red blood in your underwear.  Abdominal (lower belly) or stomach pain.  For first baby: Contractions (tightening) less than 5 minutes apart for one hour or more.  Second (plus) baby: Contractions (tightening) less than 10 minutes apart and getting stronger.  *If less than 34 weeks: Contractions (tightenings) more than 6 times in one hour.  Increase or change in vaginal discharge (note the color and amount)  Other:     Follow-up:  Make appointment with office to have blood pressure checked.

## 2017-12-06 NOTE — PLAN OF CARE
Katrina is here with rectal pressure and feels baby is coming-  SVE FT/50/-2.  She states irregular cramps.  Denies SROM or bldg.  States fetus active.  She describes it as needing to take a bowel movement-  It's been about a week since she had her last BM.  Told her that it could be as simple as that- may need to take stool softeners. Since here, will monitor BP's, track any ctx's, and make sure baby ok.  Agreeable to plan.

## 2017-12-06 NOTE — PLAN OF CARE
Informed patient of plan of care Dr. Valdovinos had ordered and patient refusing to have lab work done, refuses enema and wishes to go home and states she will follow-up with her MD in office tomorrow. Dr. Valdovinos notified of patient's request and orders for discharged received.

## 2017-12-06 NOTE — IP AVS SNAPSHOT
MRN:[id number]                      After Visit Summary   12/6/2017    Katrina Ward    MRN: [id number]           Thank you!     Thank you for choosing [facility] for your care. Our goal is always to provide you with excellent care. Hearing back from our patients is one way we can continue to improve our services. Please take a few minutes to complete the written survey that you may receive in the mail after you visit. If you would like to speak to someone directly about your visit please contact Patient Relations at [phone]. Thank you!          Patient Information     Date Of Birth          1995        About your hospital stay     You were admitted on:  December 6, 2017 You last received care in the:  [facility] Labor and Delivery    You were discharged on:  December 6, 2017       Who to Call     For medical emergencies, please call 911.  For non-urgent questions about your medical care, please call your primary care provider or clinic, [phone]          Attending Provider     Provider Specialty    Avtar Valdovinos MD OB/Gyn       Primary Care Provider Office Phone # Fax #    [facility] [phone]      Further instructions from your care team       Discharge Instruction for Undelivered Patients      You were seen for: cervical exam  We Consulted: Dr. Valdovinos  You had (Test or Medicine):fetal monitoring     Diet:   regular     Activity:  {Activity:1002545}     Call your provider if you notice:  Swelling in your face or increased swelling in your hands or legs.  Headaches that are not relieved by Tylenol (acetaminophen).  Changes in your vision (blurring: seeing spots or stars.)  Nausea (sick to your stomach) and vomiting (throwing up).   Weight gain of 5 pounds or more per week.  Heartburn that doesn't go away.  Signs of bladder infection: pain when you urinate (use the toilet), need to go more often and more urgently.  The bag  of justice (rupture of membranes) breaks, or you notice leaking in your underwear.  Bright red blood in your underwear.  Abdominal (lower belly) or stomach pain.  For first baby: Contractions (tightening) less than 5 minutes apart for one hour or more.  Second (plus) baby: Contractions (tightening) less than 10 minutes apart and getting stronger.  *If less than 34 weeks: Contractions (tightenings) more than 6 times in one hour.  Increase or change in vaginal discharge (note the color and amount)  Other:     Follow-up:  Make appointment with office to have blood pressure checked.          Pending Results     Date and Time Order Name Status Description    12/6/2017 1737 Fetal fibronectin In process             Admission Information     Date & Time Provider Department Dept. Phone    12/6/2017 Avtar Valdovinos MD [facility] Labor and Delivery [phone]      Your Vitals Were     Blood Pressure Last Period                116/75 04/01/2017          MyChart Information     [employer] gives you secure access to your electronic health record. If you see a primary care provider, you can also send messages to your care team and make appointments. If you have questions, please call your primary care clinic.  If you do not have a primary care provider, please call [phone] and they will assist you.        Care EveryWhere ID     This is your Care EveryWhere ID. This could be used by other organizations to access your [facility] medical records  [id number]        Equal Access to Services     COLE MOORE : Hadii jeni Guy, waaxda luqadaha, qaybta kaalmada yeimy, arlen dubon. So [facility] [phone].    ATENCIÓN: Si habla español, tiene a mckinley disposición servicios gratuitos de asistencia lingüística. Llame al [phone].    We comply with applicable federal civil rights laws and [place] laws. We do not discriminate on the basis of race, color, national origin, age,  disability, sex, sexual orientation, or gender identity.               Review of your medicines      Notice     You have not been prescribed any medications.             Protect others around you: Learn how to safely use, store and throw away your medicines at www.disposemymeds.org.             Medication List: This is a list of all your medications and when to take them. Check marks below indicate your daily home schedule. Keep this list as a reference.      Notice     You have not been prescribed any medications.

## 2017-12-07 NOTE — PLAN OF CARE
Reviewed discharge instructions with patient. Instructed to make appointment to have BP rechecked as she is refusing to have lab work done this evening and leave UA specimen, stated she just wanted to make sure she was not in labor. Patient wanting to go home. Patient agreeable with this plan to make appointment. Discharge to home undelivered.

## 2018-01-12 ENCOUNTER — TRANSFERRED RECORDS (OUTPATIENT)
Dept: HEALTH INFORMATION MANAGEMENT | Facility: CLINIC | Age: 23
End: 2018-01-12

## 2018-02-03 ENCOUNTER — ANESTHESIA EVENT (OUTPATIENT)
Dept: OBGYN | Facility: CLINIC | Age: 23
End: 2018-02-03
Payer: COMMERCIAL

## 2018-02-03 ENCOUNTER — HOSPITAL ENCOUNTER (INPATIENT)
Facility: CLINIC | Age: 23
LOS: 2 days | Discharge: HOME | End: 2018-02-05
Attending: OBSTETRICS & GYNECOLOGY | Admitting: OBSTETRICS & GYNECOLOGY
Payer: COMMERCIAL

## 2018-02-03 ENCOUNTER — ANESTHESIA (OUTPATIENT)
Dept: OBGYN | Facility: CLINIC | Age: 23
End: 2018-02-03
Payer: COMMERCIAL

## 2018-02-03 LAB
ABO + RH BLD: NORMAL
ABO + RH BLD: NORMAL
AMPHETAMINES UR QL SCN: NEGATIVE
CANNABINOIDS UR QL: NEGATIVE
COCAINE UR QL: ABNORMAL
HGB BLD-MCNC: 12.8 G/DL (ref 11.7–15.7)
OPIATES UR QL SCN: NEGATIVE
PCP UR QL SCN: NEGATIVE
SPECIMEN EXP DATE BLD: NORMAL

## 2018-02-03 PROCEDURE — 27110038 ZZH RX 271: Performed by: ANESTHESIOLOGY

## 2018-02-03 PROCEDURE — 00HU33Z INSERTION OF INFUSION DEVICE INTO SPINAL CANAL, PERCUTANEOUS APPROACH: ICD-10-PCS | Performed by: ANESTHESIOLOGY

## 2018-02-03 PROCEDURE — 3E0R3BZ INTRODUCTION OF ANESTHETIC AGENT INTO SPINAL CANAL, PERCUTANEOUS APPROACH: ICD-10-PCS | Performed by: ANESTHESIOLOGY

## 2018-02-03 PROCEDURE — 40000671 ZZH STATISTIC ANESTHESIA CASE

## 2018-02-03 PROCEDURE — 37000011 ZZH ANESTHESIA WARD SERVICE

## 2018-02-03 PROCEDURE — 86901 BLOOD TYPING SEROLOGIC RH(D): CPT | Performed by: OBSTETRICS & GYNECOLOGY

## 2018-02-03 PROCEDURE — 80353 DRUG SCREENING COCAINE: CPT | Performed by: OBSTETRICS & GYNECOLOGY

## 2018-02-03 PROCEDURE — 80307 DRUG TEST PRSMV CHEM ANLYZR: CPT | Performed by: OBSTETRICS & GYNECOLOGY

## 2018-02-03 PROCEDURE — 36415 COLL VENOUS BLD VENIPUNCTURE: CPT | Performed by: OBSTETRICS & GYNECOLOGY

## 2018-02-03 PROCEDURE — 25000128 H RX IP 250 OP 636: Performed by: OBSTETRICS & GYNECOLOGY

## 2018-02-03 PROCEDURE — G0463 HOSPITAL OUTPT CLINIC VISIT: HCPCS

## 2018-02-03 PROCEDURE — 12000031 ZZH R&B OB CRITICAL

## 2018-02-03 PROCEDURE — 86900 BLOOD TYPING SEROLOGIC ABO: CPT | Performed by: OBSTETRICS & GYNECOLOGY

## 2018-02-03 PROCEDURE — 25000125 ZZHC RX 250: Performed by: ANESTHESIOLOGY

## 2018-02-03 PROCEDURE — 72200001 ZZH LABOR CARE VAGINAL DELIVERY SINGLE

## 2018-02-03 PROCEDURE — 25000128 H RX IP 250 OP 636: Performed by: ANESTHESIOLOGY

## 2018-02-03 PROCEDURE — 86780 TREPONEMA PALLIDUM: CPT | Performed by: OBSTETRICS & GYNECOLOGY

## 2018-02-03 PROCEDURE — 85018 HEMOGLOBIN: CPT | Performed by: OBSTETRICS & GYNECOLOGY

## 2018-02-03 RX ORDER — KETOROLAC TROMETHAMINE 30 MG/ML
30 INJECTION, SOLUTION INTRAMUSCULAR; INTRAVENOUS ONCE
Status: COMPLETED | OUTPATIENT
Start: 2018-02-03 | End: 2018-02-03

## 2018-02-03 RX ORDER — CARBOPROST TROMETHAMINE 250 UG/ML
250 INJECTION, SOLUTION INTRAMUSCULAR
Status: DISCONTINUED | OUTPATIENT
Start: 2018-02-03 | End: 2018-02-03

## 2018-02-03 RX ORDER — SODIUM CHLORIDE, SODIUM LACTATE, POTASSIUM CHLORIDE, CALCIUM CHLORIDE 600; 310; 30; 20 MG/100ML; MG/100ML; MG/100ML; MG/100ML
INJECTION, SOLUTION INTRAVENOUS CONTINUOUS
Status: DISCONTINUED | OUTPATIENT
Start: 2018-02-03 | End: 2018-02-03

## 2018-02-03 RX ORDER — AMOXICILLIN 250 MG
1 CAPSULE ORAL 2 TIMES DAILY PRN
Status: DISCONTINUED | OUTPATIENT
Start: 2018-02-03 | End: 2018-02-05 | Stop reason: HOSPADM

## 2018-02-03 RX ORDER — PRENATAL VIT/IRON FUM/FOLIC AC 27MG-0.8MG
1 TABLET ORAL DAILY
COMMUNITY

## 2018-02-03 RX ORDER — BISACODYL 10 MG
10 SUPPOSITORY, RECTAL RECTAL DAILY PRN
Status: DISCONTINUED | OUTPATIENT
Start: 2018-02-05 | End: 2018-02-05 | Stop reason: HOSPADM

## 2018-02-03 RX ORDER — NALOXONE HYDROCHLORIDE 0.4 MG/ML
.1-.4 INJECTION, SOLUTION INTRAMUSCULAR; INTRAVENOUS; SUBCUTANEOUS
Status: DISCONTINUED | OUTPATIENT
Start: 2018-02-03 | End: 2018-02-05 | Stop reason: HOSPADM

## 2018-02-03 RX ORDER — CARBOPROST TROMETHAMINE 250 UG/ML
250 INJECTION, SOLUTION INTRAMUSCULAR
Status: DISCONTINUED | OUTPATIENT
Start: 2018-02-03 | End: 2018-02-05 | Stop reason: HOSPADM

## 2018-02-03 RX ORDER — HYDROCORTISONE 2.5 %
CREAM (GRAM) TOPICAL 3 TIMES DAILY PRN
Status: DISCONTINUED | OUTPATIENT
Start: 2018-02-03 | End: 2018-02-05 | Stop reason: HOSPADM

## 2018-02-03 RX ORDER — EPHEDRINE SULFATE 50 MG/ML
5 INJECTION, SOLUTION INTRAMUSCULAR; INTRAVENOUS; SUBCUTANEOUS
Status: DISCONTINUED | OUTPATIENT
Start: 2018-02-03 | End: 2018-02-03

## 2018-02-03 RX ORDER — ONDANSETRON 2 MG/ML
4 INJECTION INTRAMUSCULAR; INTRAVENOUS EVERY 6 HOURS PRN
Status: DISCONTINUED | OUTPATIENT
Start: 2018-02-03 | End: 2018-02-03

## 2018-02-03 RX ORDER — MISOPROSTOL 200 UG/1
400 TABLET ORAL
Status: DISCONTINUED | OUTPATIENT
Start: 2018-02-03 | End: 2018-02-05 | Stop reason: HOSPADM

## 2018-02-03 RX ORDER — OXYTOCIN/0.9 % SODIUM CHLORIDE 30/500 ML
100 PLASTIC BAG, INJECTION (ML) INTRAVENOUS CONTINUOUS
Status: DISCONTINUED | OUTPATIENT
Start: 2018-02-03 | End: 2018-02-05 | Stop reason: HOSPADM

## 2018-02-03 RX ORDER — METHYLERGONOVINE MALEATE 0.2 MG/ML
200 INJECTION INTRAVENOUS
Status: DISCONTINUED | OUTPATIENT
Start: 2018-02-03 | End: 2018-02-05 | Stop reason: HOSPADM

## 2018-02-03 RX ORDER — ACETAMINOPHEN 325 MG/1
650 TABLET ORAL EVERY 4 HOURS PRN
Status: DISCONTINUED | OUTPATIENT
Start: 2018-02-03 | End: 2018-02-03

## 2018-02-03 RX ORDER — ONDANSETRON 4 MG/1
4 TABLET, ORALLY DISINTEGRATING ORAL EVERY 6 HOURS PRN
Status: DISCONTINUED | OUTPATIENT
Start: 2018-02-03 | End: 2018-02-03

## 2018-02-03 RX ORDER — IBUPROFEN 800 MG/1
800 TABLET, FILM COATED ORAL
Status: DISCONTINUED | OUTPATIENT
Start: 2018-02-03 | End: 2018-02-03

## 2018-02-03 RX ORDER — OXYTOCIN 10 [USP'U]/ML
10 INJECTION, SOLUTION INTRAMUSCULAR; INTRAVENOUS
Status: DISCONTINUED | OUTPATIENT
Start: 2018-02-03 | End: 2018-02-03

## 2018-02-03 RX ORDER — NALOXONE HYDROCHLORIDE 0.4 MG/ML
.1-.4 INJECTION, SOLUTION INTRAMUSCULAR; INTRAVENOUS; SUBCUTANEOUS
Status: DISCONTINUED | OUTPATIENT
Start: 2018-02-03 | End: 2018-02-03

## 2018-02-03 RX ORDER — LANOLIN 100 %
OINTMENT (GRAM) TOPICAL
Status: DISCONTINUED | OUTPATIENT
Start: 2018-02-03 | End: 2018-02-05 | Stop reason: HOSPADM

## 2018-02-03 RX ORDER — OXYTOCIN/0.9 % SODIUM CHLORIDE 30/500 ML
100-340 PLASTIC BAG, INJECTION (ML) INTRAVENOUS CONTINUOUS PRN
Status: COMPLETED | OUTPATIENT
Start: 2018-02-03 | End: 2018-02-03

## 2018-02-03 RX ORDER — AMOXICILLIN 250 MG
2 CAPSULE ORAL 2 TIMES DAILY PRN
Status: DISCONTINUED | OUTPATIENT
Start: 2018-02-03 | End: 2018-02-05 | Stop reason: HOSPADM

## 2018-02-03 RX ORDER — OXYCODONE AND ACETAMINOPHEN 5; 325 MG/1; MG/1
1 TABLET ORAL
Status: DISCONTINUED | OUTPATIENT
Start: 2018-02-03 | End: 2018-02-03

## 2018-02-03 RX ORDER — ACETAMINOPHEN 325 MG/1
650 TABLET ORAL EVERY 4 HOURS PRN
Status: DISCONTINUED | OUTPATIENT
Start: 2018-02-03 | End: 2018-02-05 | Stop reason: HOSPADM

## 2018-02-03 RX ORDER — IBUPROFEN 800 MG/1
800 TABLET, FILM COATED ORAL EVERY 6 HOURS PRN
Status: DISCONTINUED | OUTPATIENT
Start: 2018-02-03 | End: 2018-02-05 | Stop reason: HOSPADM

## 2018-02-03 RX ORDER — NALBUPHINE HYDROCHLORIDE 10 MG/ML
2.5-5 INJECTION, SOLUTION INTRAMUSCULAR; INTRAVENOUS; SUBCUTANEOUS EVERY 6 HOURS PRN
Status: DISCONTINUED | OUTPATIENT
Start: 2018-02-03 | End: 2018-02-03

## 2018-02-03 RX ORDER — FENTANYL CITRATE 50 UG/ML
50-100 INJECTION, SOLUTION INTRAMUSCULAR; INTRAVENOUS
Status: DISCONTINUED | OUTPATIENT
Start: 2018-02-03 | End: 2018-02-03

## 2018-02-03 RX ORDER — OXYTOCIN/0.9 % SODIUM CHLORIDE 30/500 ML
340 PLASTIC BAG, INJECTION (ML) INTRAVENOUS CONTINUOUS PRN
Status: DISCONTINUED | OUTPATIENT
Start: 2018-02-03 | End: 2018-02-05 | Stop reason: HOSPADM

## 2018-02-03 RX ORDER — PRENATAL VIT/IRON FUM/FOLIC AC 27MG-0.8MG
1 TABLET ORAL DAILY
Status: DISCONTINUED | OUTPATIENT
Start: 2018-02-03 | End: 2018-02-05 | Stop reason: HOSPADM

## 2018-02-03 RX ORDER — METHYLERGONOVINE MALEATE 0.2 MG/ML
200 INJECTION INTRAVENOUS
Status: DISCONTINUED | OUTPATIENT
Start: 2018-02-03 | End: 2018-02-03

## 2018-02-03 RX ORDER — OXYTOCIN 10 [USP'U]/ML
10 INJECTION, SOLUTION INTRAMUSCULAR; INTRAVENOUS
Status: DISCONTINUED | OUTPATIENT
Start: 2018-02-03 | End: 2018-02-05 | Stop reason: HOSPADM

## 2018-02-03 RX ADMIN — KETOROLAC TROMETHAMINE 30 MG: 30 INJECTION, SOLUTION INTRAMUSCULAR at 20:46

## 2018-02-03 RX ADMIN — SODIUM CHLORIDE, POTASSIUM CHLORIDE, SODIUM LACTATE AND CALCIUM CHLORIDE 1000 ML: 600; 310; 30; 20 INJECTION, SOLUTION INTRAVENOUS at 16:20

## 2018-02-03 RX ADMIN — OXYTOCIN 100 ML/HR: 10 INJECTION INTRAVENOUS at 21:00

## 2018-02-03 RX ADMIN — OXYTOCIN 340 ML/HR: 10 INJECTION INTRAVENOUS at 20:29

## 2018-02-03 RX ADMIN — SODIUM CHLORIDE, POTASSIUM CHLORIDE, SODIUM LACTATE AND CALCIUM CHLORIDE: 600; 310; 30; 20 INJECTION, SOLUTION INTRAVENOUS at 17:23

## 2018-02-03 RX ADMIN — Medication: at 16:53

## 2018-02-03 NOTE — PROVIDER NOTIFICATION
02/03/18 1620   Provider Notification   Provider Name/Title Dr Jensen   Method of Notification Phone   Request Evaluate - Remote   Notification Reason Patient Arrived;SVE

## 2018-02-03 NOTE — ANESTHESIA PROCEDURE NOTES
Peripheral nerve/Neuraxial procedure note : epidural catheter  Pre-Procedure  Performed by ALEK AVILA  Referred by RACHELL  Location: OB      Pre-Anesthestic Checklist: patient identified, IV checked, risks and benefits discussed, informed consent, monitors and equipment checked, pre-op evaluation and at physician/surgeon's request    Timeout  Correct Patient: Yes   Correct Procedure: Yes   Correct Site: Yes   Correct Laterality: Yes   Correct Position: Yes   Site Marked: Yes   .   Procedure Documentation    .    Procedure:    Epidural catheter.     .  .       Assessment/Narrative  .  .  . Comments:  Patient desires Labor Epidural for labor analgesia. Vaginal delivery anticipated.    Chart reviewed. Patient examined. No changes to pre procedure chart review. Risks including but not limited to bleeding, infection, nerve injury, PDPH, intrathecal injection, high block, incomplete block, one-sided block, back pain, and low blood pressure discussed in detail. Questions answered. Consent signed.    Pause for the Cause completed. NIBP and pulse ox functioning. L&D nurse present.    Procedure: Sitting. Betadine prep x 3. Sterile drape applied.  Lidocaine 1% x 2 cc local infiltration at L 3-4.  17 G. Tu needle ML CECY 1 attempt.  No CSF, paresthesia or blood. 20 g. Epidural catheter inserted w/o resistance 5 cm.  Negative aspiration for CSF and blood. Filter in line.  Test dose Lidocaine 1.5% w/ 1:200,000 epi x 3 cc injected. Negative for neuro change or symptoms of intravascular injection.  Bolus dose: Marcaine 0.25% 5cc x 2 doses (10 cc total).  Infusion orders written.    I or my partner am immediately available. I or my partner will monitor the patient and supervise nursing care at necessary intervals.    [place]

## 2018-02-03 NOTE — PLAN OF CARE
Pt reports to l&d for r/o labor stating ucs got worse around 1400 today.  Monitors explained and applied, poc reviewed and preferences obtained. Pt has no further questions at this time.

## 2018-02-03 NOTE — ANESTHESIA PREPROCEDURE EVALUATION
PAC NOTE:       ANESTHESIA PRE EVALUATION:  Anesthesia Evaluation       history and physical reviewed .      No history of anesthetic complications          ROS/MED HX    ENT/Pulmonary:  - neg pulmonary ROS     Neurologic:  - neg neurologic ROS     Cardiovascular:  - neg cardiovascular ROS       METS/Exercise Tolerance:     Hematologic:         Musculoskeletal:         GI/Hepatic:     (+) GERD       Renal/Genitourinary:         Endo:         Psychiatric:         Infectious Disease:         Malignancy:         Other:                     Physical Exam  Normal systems: cardiovascular and pulmonary    Airway   Mallampati: II  TM distance: > 3 FB  Neck ROM: full  Mouth opening: > 3 cm    Dental     Cardiovascular       Pulmonary     Other findings: Lab Test        08/16/17     06/18/17     10/15/15     08/29/15     05/03/15                       0220          2236          1343          0100          0141          WBC          8.9           --          4.5          11.6*        8.1           HGB          12.4         12.7         14.0         13.1         12.4          MCV          92            --          90           91           88            PLT          204           --          204          197          191           INR           --           --           --           --          0.97           Lab Test        08/16/17     06/18/17     10/15/15                       0220          2236          1343          NA           139          139          142           POTASSIUM    3.6          3.6          3.7           CHLORIDE     108          108          110           CO2          23           23           24            BUN          5*           6*           9             CR           0.50*        0.52         0.70          ANIONGAP     8            8            8             BREANNA          8.1*         7.9*         9.1           GLC          91           89           108*            neg OB ROS            Anesthesia  Plan      History & Physical Review      ASA Status:  .  OB Epidural Asa: 2            Postoperative Care      Consents  Anesthetic plan, risks, benefits and alternatives discussed with:  Patient..                            .

## 2018-02-03 NOTE — IP AVS SNAPSHOT
MRN:[id number]                      After Visit Summary   2/3/2018    Katrina Ward    MRN: [id number]           Thank you!     Thank you for choosing [facility] for your care. Our goal is always to provide you with excellent care. Hearing back from our patients is one way we can continue to improve our services. Please take a few minutes to complete the written survey that you may receive in the mail after you visit. If you would like to speak to someone directly about your visit please contact Patient Relations at [phone]. Thank you!          Patient Information     Date Of Birth          1995        Designated Caregiver       Most Recent Value    Caregiver    Will someone help with your care after discharge? no      About your hospital stay     You were admitted on:  February 3, 2018 You last received care in the:  [facility]    You were discharged on:  February 5, 2018        Reason for your hospital stay       Maternity care                  Who to Call     For medical emergencies, please call 911.  For non-urgent questions about your medical care, please call your primary care provider or clinic, [phone]          Attending Provider     Provider Specialty    Gage Jensen MD OB/Gyn       Primary Care Provider Office Phone # Fax #    [facility] [phone]      After Care Instructions     Activity       Review discharge instructions            Diet       Resume previous diet            Discharge Instructions - Postpartum visit       Schedule postpartum visit with your provider and return to clinic in 6 weeks.                  Further instructions from your care team       Postpartum Vaginal Delivery Instructions       Schedule appointment in 6 weeks with your OB.    Activity       Ask family and friends for help when you need it.    Do not place anything in your vagina for 6 weeks.    You are not restricted on  other activities, but take it easy for a few weeks to allow your body to recover from delivery.  You are able to do any activities you feel up to that point.    No driving until you have stopped taking your pain medications (usually two weeks after delivery).     Call your health care provider if you have any of these symptoms:       Increased pain, swelling, redness, or fluid around your stiches from an episiotomy or perineal tear.    A fever above 100.4 F (38 C) with or without chills when placing a thermometer under your tongue.    You soak a sanitary pad with blood within 1 hour, or you see blood clots larger than a golf ball.    Bleeding that lasts more than 6 weeks.    Vaginal discharge that smells bad.    Severe pain, cramping or tenderness in your lower belly area.    A need to urinate more frequently (use the toilet more often), more urgently (use the toilet very quickly), or it burns when you urinate.    Nausea and vomiting.    Redness, swelling or pain around a vein in your leg.    Problems breastfeeding or a red or painful area on your breast.    Chest pain and cough or are gasping for air.    Problems coping with sadness, anxiety, or depression.  If you have any concerns about hurting yourself or the baby, call your provider immediately.     You have questions or concerns after you return home.     Keep your hands clean:  Always wash your hands before touching your perineal area and stitches.  This helps reduce your risk of infection.  If your hands aren't dirty, you may use an alcohol hand-rub to clean your hands. Keep your nails clean and short.        Pending Results     Date and Time Order Name Status Description    2/3/2018 1640 Cocaine qualitative confirm urine In process             Statement of Approval     Ordered          02/05/18 1211  I have reviewed and agree with all the recommendations and orders detailed in this document.  EFFECTIVE NOW     Approved and electronically signed by:   "Gage Jensen MD             Admission Information     Date & Time Provider Department Dept. Phone    2/3/2018 Gage Jensen MD [facility] [phone]      Your Vitals Were     Blood Pressure Pulse Temperature Respirations Height Weight    124/78 75 98.4  F (36.9  C) (Oral) 18 1.575 m (5' 2\") 84.8 kg (187 lb)    Last Period BMI (Body Mass Index)                04/01/2017 34.2 kg/m2          MyChart Information     [facility] gives you secure access to your electronic health record. If you see a primary care provider, you can also send messages to your care team and make appointments. If you have questions, please call your primary care clinic.  If you do not have a primary care provider, please call [phone] and they will assist you.        Care EveryWhere ID     This is your Care EveryWhere ID. This could be used by other organizations to access your [place] medical records  [id number]        Equal Access to Services     COLE MOORE : Hadii aad ku hadasho Sochikiali, waaxda luqadaha, qaybta kaalmada adeegyada, alren yung . So [facility] [phone].    ATENCIÓN: Si habla español, tiene a mckinley disposición servicios gratuitos de asistencia lingüística. Llame al [phone].    We comply with applicable federal civil rights laws and [place] laws. We do not discriminate on the basis of race, color, national origin, age, disability, sex, sexual orientation, or gender identity.               Review of your medicines      CONTINUE these medicines which have NOT CHANGED        Dose / Directions    prenatal multivitamin plus iron 27-0.8 MG Tabs per tablet        Dose:  1 tablet   Take 1 tablet by mouth daily   Refills:  0                Protect others around you: Learn how to safely use, store and throw away your medicines at www.disposemymeds.org.             Medication List: This is a list of all your medications and when to take them. Check marks below " indicate your daily home schedule. Keep this list as a reference.      Medications           Morning Afternoon Evening Bedtime As Needed    prenatal multivitamin plus iron 27-0.8 MG Tabs per tablet   Take 1 tablet by mouth daily   Last time this was given:  1 tablet on 2/5/2018  9:02 AM

## 2018-02-03 NOTE — IP AVS SNAPSHOT
[facility] [place] [facility]    [address] [facility] [address]    Phone:  [phone]    Fax:  [phone]                                       After Visit Summary   2/3/2018    Katrina Ward    MRN: [id number]           After Visit Summary Signature Page     I have received my discharge instructions, and my questions have been answered. I have discussed any challenges I see with this plan with the nurse or doctor.    ..........................................................................................................................................  Patient/Patient Representative Signature      ..........................................................................................................................................  Patient Representative Print Name and Relationship to Patient    ..................................................               ................................................  Date                                            Time    ..........................................................................................................................................  Reviewed by Signature/Title    ...................................................              ..............................................  Date                                                            Time

## 2018-02-04 LAB — T PALLIDUM IGG+IGM SER QL: NEGATIVE

## 2018-02-04 PROCEDURE — 25000132 ZZH RX MED GY IP 250 OP 250 PS 637: Performed by: OBSTETRICS & GYNECOLOGY

## 2018-02-04 PROCEDURE — 12000027 ZZH R&B OB

## 2018-02-04 RX ADMIN — SENNOSIDES AND DOCUSATE SODIUM 1 TABLET: 8.6; 5 TABLET ORAL at 08:51

## 2018-02-04 RX ADMIN — ACETAMINOPHEN 650 MG: 325 TABLET, FILM COATED ORAL at 07:24

## 2018-02-04 RX ADMIN — IBUPROFEN 800 MG: 800 TABLET, FILM COATED ORAL at 22:11

## 2018-02-04 RX ADMIN — SENNOSIDES AND DOCUSATE SODIUM 1 TABLET: 8.6; 5 TABLET ORAL at 22:11

## 2018-02-04 RX ADMIN — PRENATAL VIT W/ FE FUMARATE-FA TAB 27-0.8 MG 1 TABLET: 27-0.8 TAB at 08:51

## 2018-02-04 RX ADMIN — IBUPROFEN 800 MG: 800 TABLET, FILM COATED ORAL at 04:38

## 2018-02-04 NOTE — PLAN OF CARE
Discussion with delivery RN and Bridge nurse re: +tox for cocaine and mandatory notification process. Policies reviewed at length. After consulting with Nursing Supervisor, no  available to contact for direction/policy clarification at this time. [facility] ([phone]) called at [phone]. Spoke with Allyn Delarosa who states she did not think we needed further follow up tonight unless we felt there to be immediate safety issues in which case we would call the police. Currently baby is in room with parents who are very loving and attentive to baby per the RN. Allyn will make a few calls tonight and will call back with any new information. Otherwise she will plan to contact SW and CPS in the AM. Pt admitted to 444 after delivery--room is in direct line of sight from postpartum desk and will be observed for coming and going. Pt's RN will also check on her and the baby frequently tonight.

## 2018-02-04 NOTE — PROVIDER NOTIFICATION
02/03/18 1921   Comments   Comments bedside report given To Terrie VORA RN. cares handed over.

## 2018-02-04 NOTE — PROVIDER NOTIFICATION
02/04/18 0008   Provider Notification   Provider Name/Title - [facility]   Method of Notification Electronic Page   Request Evaluate-Remote   Notification Reason Lab Results;Other  (tox screen positive for cocaine)   Pediatrician notified that moms urine tox was positive for cocaine. MD notified that moms urine tox was positive for cocaine. Primary nurse and charge nurse made aware of results.

## 2018-02-04 NOTE — PROGRESS NOTES
Care Transition Initial Assessment - GREGG ESCOBEDO met with CHIRAG to discuss report of positive cocaine in her urine and possible trace amount in her  infant.  Other toxicology tests pending.  Also present was CHIRAG's mother who pt requested to remain in the room.  MOB was dressing and holding baby during interview.         DATA:  Pt is a 22- year- old mom of two children.   Oldest child is Farida Chu,  9/9/15 and  is Kelvin Chu,  2/3/18.  FOB is Luis Chu,  90 and he signed Recognition of Parentage for both births.  Currently CHIRAG lives at [address], [place]  [address] with her child and YANET lives with his mother in [place].  YANET's number is [phone].      Identified issues/concerns regarding health management:  MOB tested positive for cocaine and  had trace amounts of cocaine in urine. Toxicology for infant pending.   CHIRAG states she did not use cocaine and said a night nurse told her the test would be repeated to make sure it was accurate.  MOB then inquired if she could get the test done with a private lab and grandma stated she believes her daughter completely.  CHIRAG said her SO works at a bar [place]wn and wonders if she could have touched something down there.  CHIRAG said she tested positive for cocaine only once in her pregnancy and has not used since.  (report was filed with CPS per OB notes) CHIRAG said she has used alcohol, cocaine and marijuana in her past and SO uses only marijuana.           ASSESSMENT  Cognitive Status:  CHIRAG's affect appears flat and monotone.  CHIRAG states she is very stressed right now and wondering if she should leave hospital today.  CHIRAG appears to be in denial concerning her use minimizing it.        PLAN   SW made report to [facility] Child Protection ([place]) who stated infant should be placed on a hold IF MOB decides to leave.  CPS will review report in morning and determine next step.  CPS also requested MOB not  "breastfeed her infant.        Patient Goals and Preferences: CHIRAG said she is ready to go home but verbalized understand that she should stay until visit by Child Protection tomorrow.  MOB informed her baby cannot leave and will be placed on a hold should she attempt to leave.  CHIRAG said \"I am not leaving without my baby.\"  Patient anticipates discharging to:  Home with support of family.      Addendum:  72 hour hold written by pediatrician for baby.  [place] Police contacted for 72 hour hold which was placed.  GREGG spoke with CHIRAG regarding 72 hour hold and answered her questions.      CHIRAG has current OFP against FOB per [place] [employer].        "

## 2018-02-04 NOTE — PLAN OF CARE
Problem: Patient Care Overview  Goal: Plan of Care/Patient Progress Review  Outcome: No Change  VSS. Patient up independently in room, has been also walking to smoke outside, declined any tobacco cessation help from MD. Patient has been quiet and  appropriate with baby, sister present this afternoon. Patient however has become upset re 72 hour hold on baby when informed by police that this was happening.

## 2018-02-04 NOTE — L&D DELIVERY NOTE
OB Vaginal Delivery Note    Katrina Ward MRN# [id number]   Age: 22 year old YOB: 1995     Katrina Ward is a 22 year old-year-old female,  with PATRICIA 2018, who was admitted for active labor at 39w1d. Her prenatal care was at the [facility] in [place]. Prenatal course was complicated by history of pre-eclampsia and  delivery at 34w in prior pregnancy, substance abuse with initial NOB utox positive for cocaine, tobacco abuse, concern for IUGR resolved and EFW 16%. Vaginal Group B Streptococcus culture was negative and penicillin IV prophylaxis was not given.  The estimated fetal weight was 3000 g.  Oxytocin induction/augmentation was not initiated per standard protocol for inadequate labor.  The patient delivered a viable infant with apgars of 9   and 9   and weight of 2995 g at  on 2/3/2018. Delivery was via vaginal, spontaneous delivery  to a sterile field under epidural  anesthesia. Infant delivered in vertex  right  occiput  anterior  position. Anterior and posterior shoulders delivered without difficulty. There was no nuchal cord. The  was suctioned free of fluid from the oral and nasal cavities. The cord was delay clamped, cut twice and 3 vessels  were noted. A section of cord was clamped and cut. Cord blood was obtained in routine fashion with the following disposition: lab .    Placenta delivered at 2/3  8:31 PM  examined and noted to be intact. Placental disposition was Hospital disposal . Fundal massage performed and fundus found to be firm.    The perineum, vagina and cervix were inspected and the patient had bilateral 1st degree periurethral tears that were hemostatic and not repaired  Sponge count correct. Final vaginal exam yielded no retained foreign objects. Infant and patient in delivery room in good and stable condition.  QBL for the delivery was 157 mL  Gage Jensen MD 2/3/2018

## 2018-02-04 NOTE — PLAN OF CARE
Problem: Patient Care Overview  Goal: Plan of Care/Patient Progress Review  Outcome: No Change  Data: Katrina Ward transferred to 444 via wheelchair at 2245. Baby transferred via crib.  Action: Receiving unit notified of transfer: Yes. Patient and family notified of room change. Report given to Salena LIM RN at 2300. Belongings sent to receiving unit. Accompanied by Registered Nurse. Oriented patient to surroundings. Call light within reach. ID bands double-checked with receiving RN.  Response: Patient tolerated transfer and is stable.

## 2018-02-04 NOTE — ANESTHESIA POSTPROCEDURE EVALUATION
Patient: Katrina Ward    * No procedures listed *    Diagnosis:* No pre-op diagnosis entered *  Diagnosis Additional Information: No value filed.    Anesthesia Type:  No value filed.    Note:  Anesthesia Post Evaluation         Comments:     S/P epidural for labor.   I or my partner was immediately available for management of this patient during epidural analgesia infusion.  VSS.  Doing well. Block resolved.  Neuro at baseline. Denies positional headache. Minimal side effects easily managed w/ PRN meds. No apparent anesthetic complications. No follow-up required.    Bennie Medina MD          Last vitals:  Vitals:    02/04/18 0215 02/04/18 0648 02/04/18 0849   BP: 128/64 126/80 113/64   Pulse: 85 70 74   Resp: 16 18 16   Temp: 98.4  F (36.9  C) 98.2  F (36.8  C) 97.8  F (36.6  C)         Electronically Signed By: Bennie Medina MD  February 4, 2018  2:00 PM

## 2018-02-04 NOTE — PROVIDER NOTIFICATION
02/03/18 2021   Provider Notification   Provider Name/Title dr Jensen   Method of Notification At Bedside   MD at bedside for delivery

## 2018-02-04 NOTE — PLAN OF CARE
Pt informed of positive tox for cocaine and further qualitative testing sent out to [facility] for confirmation.  Pt adamantly denies any recent use of cocaine and questioning if baby will be taken away.  Pt understands that urine and meconium will be tested on baby and Social Work to follow.

## 2018-02-04 NOTE — PLAN OF CARE
Urine toxicology screen results came back positive for cocaine on mom. Recommendation by primary and nursery nurse was made to contact appropriate authorities needed to initiate hold until social work or CPS can further evaluate the situation. House Supervisor and Charge Nurse aware of  positive result, they made the decision not to implement the hold policy or notify identified authorities/mandated reporters to complete the required initial paperwork for a hold at this time. The plan was made by supervisors to have social work to follow up ASAP. Pediatrician and OB aware of positive results.

## 2018-02-04 NOTE — PROVIDER NOTIFICATION
02/03/18 1805   Provider Notification   Provider Name/Title Dr Jensen   Method of Notification Electronic Page;Phone   Request Evaluate - Remote   Notification Reason SVE   updated re: sve;arom, pt reports negative GBS, Dr Jensen to provide lab results.

## 2018-02-04 NOTE — PROGRESS NOTES
[facility] Post[facility] Note    S:  Patient without complaints.  Minimal lochia.  Breastfeeding planned for colostrum only, otherwise bottle feeding.  Pain controlled.     O:  Vitals were reviewed. Isolated elevated blood pressure within an hour prior to delivery.        Abdomen - Fundus firm, at umbilicus, nontender        Extremities - No calf tenderness, no unilateral edema    RH(D)   Date Value Ref Range Status   02/03/2018 Pos  Final       Assessment and Plan:   Postpartum Day #1, status post vaginal delivery, doing well.  -- Routine care status post delivery  -- Concern of IUGR with AGA baby weight  -- +Cocaine during pregnancy and on admission: SW consulted but no note yet in chart. Peds aware. Patient's mom was in the room during, so we did not discuss this. Confirmed that IV access has been removed as she has been going outside.   -- Tobacco use: patient declines nicotine patches and prefers to go outside to smoke      Brynn Condon MD

## 2018-02-04 NOTE — H&P
[facility]    History and Physical  Obstetrics and Gynecology     Date of Admission:  2/3/2018    Assessment & Plan   Katrina Ward is a 22 year old female  who presents with active labor  ASSESSMENT:   IUP @ 39w1d  NST reactive.  Category  I    PLAN:   Admit - see IP orders  Pain medication epdiural  Anticipate   History of pre-eclampsia: BPs normal, asymptomatic, EFW 16%  History of  delivery @ 34w secondary to severe pre-eclampsia  History of substance abuse: utox on admit preliminarily positive for cocaine, will involve SW and likely CPS  Tobacco abuse    Gage Jensen MD (pager [phone])  [facility]    History of Present Illness   Katrina Ward is a 22 year old female  @ 39w1d Estimated Date of Delivery: 2018 is calculated from 14w5d ultrasound is [facility] the [facility]  in active labor. No changes since last clinic visit. Patient denies headache, chest pain, shortness of breath, nausea, vomiting, diarrhea, dysuria, fever, weakness, increased swelling. Denies headache, changes in vision, dyspnea, upper abdominal pain.     PRENATAL COURSE  Prenatal care was received at [facility] and the course was complicated by history of pre-eclampsia and  delivery at 34w in prior pregnancy, substance abuse with initial NOB utox positive for cocaine, tobacco abuse, concern for IUGR resolved and EFW 16%    Recent Labs   Lab Test  18   1639  17   2236   ABO  AB  AB   RH  Pos   Pos   AS   --   Neg     Rhogam not indicated   Recent Labs   Lab Test 17   HEPBANG  nonreactive   HIAGAB  nonreactive       Past Medical History    I have reviewed this patient's medical history and updated it with pertinent information if needed.   Past Medical History:   Diagnosis Date     Pre-eclampsia      Scoliosis      Substance abuse     in the past       Past Surgical History   I  have reviewed this patient's surgical history and updated it with pertinent information if needed.  History reviewed. No pertinent surgical history.    Prior to Admission Medications   Prior to Admission Medications   Prescriptions Last Dose Informant Patient Reported? Taking?   Prenatal Vit-Fe Fumarate-FA (PRENATAL MULTIVITAMIN PLUS IRON) 27-0.8 MG TABS per tablet 2/2/2018 at Unknown time  Yes Yes   Sig: Take 1 tablet by mouth daily      Facility-Administered Medications: None     Allergies   No Known Allergies    Social History   I have reviewed this patient's social history and updated it with pertinent information if needed. Katrina Ward  reports that she has been smoking.  She has been smoking about 1.00 pack per day. She has never used smokeless tobacco. She reports that she drinks alcohol. She reports that she does not use illicit drugs.    Family History   I have reviewed this patient's family history and updated it with pertinent information if needed.   No family history on file.    Physical Exam   Temp: 97.5  F (36.4  C) Temp src: Oral BP: 102/56              Vital Signs with Ranges  Temp:  [97.5  F (36.4  C)-97.7  F (36.5  C)] 97.5  F (36.4  C)  BP: ()/(55-76) 102/56    Abdomen: gravid, single vertex fetus, non-tender, EFW 3000 g  Cervical Exam: per nursing 9.5/ 100/ Anterior/ soft/ 0     Fetal Heart Tones: 145 bpm baseline, moderate variablility, + accels, no decels and Category I  TOCO:   external monitor and frequency q 2 minutes    Constitutional: healthy, alert and active   Respiratory: breathing unlabored  Cardiovascular: regular rate and rhythm, pedal pulses 2+  Musculoskeletal: lower extremities nontender, edema: none  Neurologic: Awake, alert, oriented to name, place and time.  Neuropsychiatric: cooperative, mood good, stable, appropriate

## 2018-02-04 NOTE — PLAN OF CARE
Problem: Patient Care Overview  Goal: Plan of Care/Patient Progress Review  Outcome: Improving  VSS, Bonding well with baby. Pain is well controlled with tylenol and ibuprofen. Patient is voiding without difficulty using christina-bottle and ambulating independently. Tolerating regular diet well. Independent in self and baby cares. Patients tox screen was positive for cocaine. Patient is denying cocaine use. Plan was made with nursing supervisor Doris to proceed with social work consult in the morning due to no  available this shift. See above notes from Elsa and Doris.  Mom was attentive to infant needs and self cares. Mom is a smoker and left the floor to smoke this shift. Mom wants to breastfeed because she feels bad she  her first. Infant is currently formula feeding. Provided mom with a hand pump if she decided she wanted to start also educated on hand expression, declined to watch the video. Mom was educated that if she is currently using cocaine breastfeeding is not recommended and she should have a conversation with the pediatrician in the morning during rounds to make a plan for feeding the infant in the future. Room orientation and education provided see flow sheet.

## 2018-02-05 ENCOUNTER — NURSE TRIAGE (OUTPATIENT)
Dept: NURSING | Facility: CLINIC | Age: 23
End: 2018-02-05

## 2018-02-05 VITALS
DIASTOLIC BLOOD PRESSURE: 78 MMHG | HEIGHT: 62 IN | BODY MASS INDEX: 34.41 KG/M2 | TEMPERATURE: 98.4 F | HEART RATE: 75 BPM | WEIGHT: 187 LBS | SYSTOLIC BLOOD PRESSURE: 124 MMHG | RESPIRATION RATE: 18 BRPM

## 2018-02-05 PROCEDURE — 25000132 ZZH RX MED GY IP 250 OP 250 PS 637: Performed by: OBSTETRICS & GYNECOLOGY

## 2018-02-05 RX ADMIN — PRENATAL VIT W/ FE FUMARATE-FA TAB 27-0.8 MG 1 TABLET: 27-0.8 TAB at 09:02

## 2018-02-05 RX ADMIN — IBUPROFEN 800 MG: 800 TABLET, FILM COATED ORAL at 09:02

## 2018-02-05 NOTE — PLAN OF CARE
Problem: Patient Care Overview  Goal: Plan of Care/Patient Progress Review  Outcome: Adequate for Discharge Date Met: 02/05/18  VSS. Ibuprofen given for cramping. Patient seen by SWS and CPS this shift.Seen by OB and discharged to home today Routine follow up in 6 weeks. All discharge teaching completed, will take ROP papers home as FOB not present at this time. CPS agree that patient can stay bed and board. Patient will take OTC ibuprofen and stool softener. All questions answered, patient calm and co operative at this time. Has visited baby in [facility] this shift and had photos done. Put bed and board at 14.15.

## 2018-02-05 NOTE — PROGRESS NOTES
[facility] [place]  OB Post-partum progress note    Assessment: 22 year old  PPD#2 s/p     Plan:  - Substance abuse: cocaine confirmation in progress, baby on 72 hour hold, SW and CPS involved, Police aware  - Tobacco abuse: IV removed  - Routine postpartum management  - Pain: controlled on PO pain meds  - Disposition: plans discharge home today to maternal boarding status  - Discussed activity restrictions including: nothing per vagina for 6 weeks (sex, tampons, douching),   - Reviewed warning signs to call for including fever greater than 100.4F, severe nausea or vomiting, uncontrolled pain, vaginal bleeding more than 1 pad per hour for two hours, signs of depression, severe persistent headache or visual disturbance, chest pain or dyspnea     Gage Jensen MD  (pager [phone])  [facility]  2018    Subjective: Katrina Ward feels well this morning. Was able to sleep last night. Pain control adequate. Lochia minimal. Voiding. Formula feeding. Mood Fair.     Objective:   Vitals:    18 0849 18 1609 18 0153 18 0737   BP: 113/64 132/80 137/75 124/78   Pulse: 74 90 78 75   Resp:    Temp: 97.8  F (36.6  C) 97.8  F (36.6  C) 97.7  F (36.5  C) 98.4  F (36.9  C)   TempSrc: Oral Oral Oral Oral   Weight:       Height:           General: healthy, alert and no distress  Abd: soft, appropriately tender, fundus firm  Legs: Non-tender, 0+ pitting edema    Hemoglobin   Date Value Ref Range Status   2018 12.8 11.7 - 15.7 g/dL Final   2017 12.4 11.7 - 15.7 g/dL Final       Lab Results   Component Value Date    RH Pos 2018

## 2018-02-05 NOTE — PLAN OF CARE
"Problem: Patient Care Overview  Goal: Plan of Care/Patient Progress Review  Outcome: No Change    Denies any discomfort/pain . Refused pain meds when offered. Mom is up and ambulating on hallway and outside with friend. @2000, offered to do discharge education but pt states \" We can wait, im really tired right now\"  Encouraged to call when ready for education.  Instructed to fill out birth certificate and depression paper. Will need ROP but FOB will not be here tomorrow  Per pt . Mom has gone to nursery to see baby , formula feed baby and also watch baby bath.        "

## 2018-02-05 NOTE — TELEPHONE ENCOUNTER
Katrina calling for her lab results.  Did advise as noted in EPIC.  1 lab drawn 2/3/18 remains pending.      Additional Information    [1] Follow-up call to recent contact AND [2] information only call, no triage required    Protocols used: INFORMATION ONLY CALL-ADULT-

## 2018-02-05 NOTE — PLAN OF CARE
Problem: Patient Care Overview  Goal: Plan of Care/Patient Progress Review  Infant and mother of infant were rebanded at 0412. All four bands were verified by writer and Elsa THOMAS. The second band was placed on the infants aunt. Education provided to keep bands intact until discharge.

## 2018-02-05 NOTE — PLAN OF CARE
Problem: Patient Care Overview  Goal: Plan of Care/Patient Progress Review  The previous bands from infant and mother of infant three bands total were destroyed. Placed in shredder.

## 2018-02-05 NOTE — PLAN OF CARE
Problem: Patient Care Overview  Goal: Plan of Care/Patient Progress Review  Outcome: Improving  Pt called at 2210 and asked for security to see baby . ROP given to pt.  Ibuprofen given for mild uterine cramping. Mom appears in good mood and smiling.

## 2018-02-05 NOTE — DISCHARGE SUMMARY
[facility]    Discharge Summary  Obstetrics    Date of Admission:  2/3/2018  Date of Discharge:  2018  Discharging Provider: Gage Jensen    Discharge Diagnoses   Patient Active Problem List   Diagnosis     Indication for care in labor or delivery      (normal spontaneous vaginal delivery)   Substance abuse, cocaine  Tobacco abuse    History of Present Illness   Katrina Ward is a 22 year old female now  who presented to L&D @ 39w1d in active labor. Her pregnancy has been complicated by above diagnoses. Please see her admit H&P for full details of her PMH, PSH, Meds, Allergies and exam on admit.    Hospital Course   The patient had a Normal spontaneous vaginal delivery @ 39w1d, please see her delivery summary for full details.     Her postpartum course was uncomplicated. On postpartum day 2, she was meeting all of her postpartum goals and deemed stable for discharge. She was voiding without difficulty, tolerating a regular diet without nausea and vomiting, her pain was well controlled on oral pain medicines and her lochia was appropriate.    Hgb:   Lab Results   Component Value Date    HGB 12.8 2018    HGB 12.4 2017       Lab Results   Component Value Date    RH Pos 2018    and rhogam was not given    Contraception was discussed and will be addressed at her postpartum appointment.    Instructions:  1) Call for temperature greater than 100.4F, foul smelling vaginal discharge, bleeding more than 1 pad per hour for 2 hrs, pain not controlled by oral pain meds, severe constipation or severe nausea or vomiting.  2) She was instructed to follow-up with her primary OB in 6 weeks for a routine postpartum visit  3) She was instructed to continue her PNV on discharge if she wished to breast feed her infant.    Gage Jensen MD    Discharge Disposition   Discharged to home   Condition at discharge: Stable    Primary Care Physician   Raf Rene  [facility]    Consultations This Hospital Stay   SOCIAL WORK IP CONSULT  SOCIAL WORK IP CONSULT  ANESTHESIOLOGY IP CONSULT  SOCIAL WORK IP CONSULT  HOME CARE POST PARTUM/ IP CONSULT  LACTATION IP CONSULT    Discharge Orders     Activity   Review discharge instructions     Reason for your hospital stay   Maternity care     Discharge Instructions - Postpartum visit   Schedule postpartum visit with your provider and return to clinic in 6 weeks.     Diet   Resume previous diet       Discharge Medications   Current Discharge Medication List      CONTINUE these medications which have NOT CHANGED    Details   Prenatal Vit-Fe Fumarate-FA (PRENATAL MULTIVITAMIN PLUS IRON) 27-0.8 MG TABS per tablet Take 1 tablet by mouth daily           Allergies   No Known Allergies
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
## 2018-02-05 NOTE — PLAN OF CARE
Problem: Patient Care Overview  Goal: Plan of Care/Patient Progress Review  Outcome: Improving  VSS, denies pain this shift. Patient is voiding without difficulty using christina-bottle and ambulating independently. Tolerating regular diet well. Independent in self cares. Infant is formula fed and tolerating well. Infant is on a 72 hour hold in the nursery. Patient and infant bonding complicated by patient inability to visit infant in nursery. Patient had requested a visit with the infant on the previous shift however security was unable to come up to the floor until 03:55. Nursery nurse was in contact with security thruought the night attempting to arrange a time. Mom was able to feed, burp, hold, and take some photos with infant. Sister of patient was also able to hold infant. Mom was appropriate with infant and attentive to infant needs. Mom has also been appropriate with staff. Patient's sister is in the room and attentive to moms needs. Mom would like to have social work come in and talk with her in the morning as she is still somewhat confused as to the next steps.

## 2018-02-08 LAB
BZE UR QL: NORMAL NG/ML
COCAINE UR CFM-MCNC: NEGATIVE NG/ML

## 2018-05-29 ENCOUNTER — PRENATAL OFFICE VISIT (OUTPATIENT)
Dept: NURSING | Facility: CLINIC | Age: 23
End: 2018-05-29

## 2018-05-29 ENCOUNTER — PRENATAL OFFICE VISIT (OUTPATIENT)
Dept: OBGYN | Facility: CLINIC | Age: 23
End: 2018-05-29

## 2018-05-29 VITALS
HEART RATE: 87 BPM | BODY MASS INDEX: 30.62 KG/M2 | SYSTOLIC BLOOD PRESSURE: 117 MMHG | DIASTOLIC BLOOD PRESSURE: 74 MMHG | HEIGHT: 62 IN | WEIGHT: 166.4 LBS | TEMPERATURE: 98 F

## 2018-05-29 DIAGNOSIS — O09.90 HIGH-RISK PREGNANCY, UNSPECIFIED TRIMESTER: Primary | ICD-10-CM

## 2018-05-29 DIAGNOSIS — O20.0 THREATENED ABORTION: Primary | ICD-10-CM

## 2018-05-29 PROBLEM — F19.91 HISTORY OF ILLICIT DRUG USE: Status: ACTIVE | Noted: 2018-04-23

## 2018-05-29 PROBLEM — Z23 NEED FOR TDAP VACCINATION: Status: ACTIVE | Noted: 2018-05-29

## 2018-05-29 PROBLEM — Z86.59 HISTORY OF ANXIETY: Status: ACTIVE | Noted: 2017-09-21

## 2018-05-29 LAB
ABO + RH BLD: NORMAL
ABO + RH BLD: NORMAL
ALBUMIN UR-MCNC: ABNORMAL MG/DL
AMPHETAMINES UR QL SCN: NEGATIVE
APPEARANCE UR: CLEAR
BETA HCG QUAL IFA URINE: POSITIVE
BILIRUB UR QL STRIP: NEGATIVE
BLD GP AB SCN SERPL QL: NORMAL
BLOOD BANK CMNT PATIENT-IMP: NORMAL
CANNABINOIDS UR QL: NEGATIVE
COCAINE UR QL: NEGATIVE
COLOR UR AUTO: YELLOW
ERYTHROCYTE [DISTWIDTH] IN BLOOD BY AUTOMATED COUNT: 13.2 % (ref 10–15)
GLUCOSE UR STRIP-MCNC: NEGATIVE MG/DL
HCT VFR BLD AUTO: 39.1 % (ref 35–47)
HGB BLD-MCNC: 13.6 G/DL (ref 11.7–15.7)
HGB UR QL STRIP: ABNORMAL
KETONES UR STRIP-MCNC: ABNORMAL MG/DL
LEUKOCYTE ESTERASE UR QL STRIP: ABNORMAL
MCH RBC QN AUTO: 30.8 PG (ref 26.5–33)
MCHC RBC AUTO-ENTMCNC: 34.8 G/DL (ref 31.5–36.5)
MCV RBC AUTO: 89 FL (ref 78–100)
NITRATE UR QL: NEGATIVE
OPIATES UR QL SCN: NEGATIVE
PCP UR QL SCN: NEGATIVE
PH UR STRIP: 6 PH (ref 5–7)
PLATELET # BLD AUTO: 260 10E9/L (ref 150–450)
RBC # BLD AUTO: 4.41 10E12/L (ref 3.8–5.2)
SOURCE: ABNORMAL
SP GR UR STRIP: >1.03 (ref 1–1.03)
SPECIMEN EXP DATE BLD: NORMAL
UROBILINOGEN UR STRIP-ACNC: 0.2 EU/DL (ref 0.2–1)
WBC # BLD AUTO: 10.2 10E9/L (ref 4–11)

## 2018-05-29 PROCEDURE — 86900 BLOOD TYPING SEROLOGIC ABO: CPT | Performed by: OBSTETRICS & GYNECOLOGY

## 2018-05-29 PROCEDURE — 87389 HIV-1 AG W/HIV-1&-2 AB AG IA: CPT | Performed by: OBSTETRICS & GYNECOLOGY

## 2018-05-29 PROCEDURE — 83021 HEMOGLOBIN CHROMOTOGRAPHY: CPT | Mod: 90 | Performed by: OBSTETRICS & GYNECOLOGY

## 2018-05-29 PROCEDURE — 36415 COLL VENOUS BLD VENIPUNCTURE: CPT | Performed by: OBSTETRICS & GYNECOLOGY

## 2018-05-29 PROCEDURE — 87086 URINE CULTURE/COLONY COUNT: CPT | Performed by: OBSTETRICS & GYNECOLOGY

## 2018-05-29 PROCEDURE — 84702 CHORIONIC GONADOTROPIN TEST: CPT | Performed by: OBSTETRICS & GYNECOLOGY

## 2018-05-29 PROCEDURE — 85027 COMPLETE CBC AUTOMATED: CPT | Performed by: OBSTETRICS & GYNECOLOGY

## 2018-05-29 PROCEDURE — 84703 CHORIONIC GONADOTROPIN ASSAY: CPT | Performed by: OBSTETRICS & GYNECOLOGY

## 2018-05-29 PROCEDURE — 81003 URINALYSIS AUTO W/O SCOPE: CPT | Performed by: OBSTETRICS & GYNECOLOGY

## 2018-05-29 PROCEDURE — 86850 RBC ANTIBODY SCREEN: CPT | Performed by: OBSTETRICS & GYNECOLOGY

## 2018-05-29 PROCEDURE — 87340 HEPATITIS B SURFACE AG IA: CPT | Performed by: OBSTETRICS & GYNECOLOGY

## 2018-05-29 PROCEDURE — 80307 DRUG TEST PRSMV CHEM ANLYZR: CPT | Performed by: OBSTETRICS & GYNECOLOGY

## 2018-05-29 PROCEDURE — 86762 RUBELLA ANTIBODY: CPT | Performed by: OBSTETRICS & GYNECOLOGY

## 2018-05-29 PROCEDURE — 99207 ZZC NO CHARGE NURSE ONLY: CPT

## 2018-05-29 PROCEDURE — 86780 TREPONEMA PALLIDUM: CPT | Performed by: OBSTETRICS & GYNECOLOGY

## 2018-05-29 PROCEDURE — 86901 BLOOD TYPING SEROLOGIC RH(D): CPT | Performed by: OBSTETRICS & GYNECOLOGY

## 2018-05-29 PROCEDURE — 99000 SPECIMEN HANDLING OFFICE-LAB: CPT | Performed by: OBSTETRICS & GYNECOLOGY

## 2018-05-29 NOTE — PROGRESS NOTES
Patient left without being seen.  Doctor was behind.    Chart reviewed: recommend f/u sonogram.    Francoise Denton MD

## 2018-05-29 NOTE — MR AVS SNAPSHOT
After Visit Summary   5/29/2018    Katrina Ward    MRN: [id number]           Patient Information     Date Of Birth          1995        Visit Information        Provider Department      5/29/2018 2:30 PM RD OB NURSE EDUCATION [facility]        Today's Diagnoses     High-risk pregnancy, unspecified trimester    -  1       Follow-ups after your visit        Your next 10 appointments already scheduled     May 29, 2018  3:15 PM CDT   New Prenatal with Francoise Denton MD   [facility] ([facility])    [address] [place] [id number] [phone]              Who to contact     If you have questions or need follow up information about today's clinic visit or your schedule please contact [facility] directly at [phone].  Normal or non-critical lab and imaging results will be communicated to you by MyChart, letter or phone within 4 business days after the clinic has received the results. If you do not hear from us within 7 days, please contact the clinic through [facility] or phone. If you have a critical or abnormal lab result, we will notify you by phone as soon as possible.  Submit refill requests through [facility] or call your pharmacy and they will forward the refill request to us. Please allow 3 business days for your refill to be completed.          Additional Information About Your Visit        MyChart Information     [facility] gives you secure access to your electronic health record. If you see a primary care provider, you can also send messages to your care team and make appointments. If you have questions, please call your primary care clinic.  If you do not have a primary care provider, please call [phone] and they will assist you.        Care EveryWhere ID     This is your Care EveryWhere ID. This could be used by other organizations to access your [place] medical records  [id number]       "  Your Vitals Were     Pulse Temperature Height BMI (Body Mass Index)          87 98  F (36.7  C) 5' 2\" (1.575 m) 30.43 kg/m2         Blood Pressure from Last 3 Encounters:   18 117/74   18 124/78   17 128/64    Weight from Last 3 Encounters:   18 166 lb 6.4 oz (75.5 kg)   18 187 lb (84.8 kg)   17 160 lb (72.6 kg)              We Performed the Following     ABO/RH Type and Screen     Beta HCG Qual, Urine - [facility] and [place] ([id number])     CBC with Platelets     Drug abuse scrn 7 UR (/) (RH, SH, UR)     Hepatitis B surface antigen     HGB Eval Reflex to ELP or RBC Solubility     HIV Antigen Antibody Combo     Rubella Antibody IgG Quantitative     Treponema Abs w Reflex to RPR and Titer     UA without Microscopic     Urine Culture Aerobic Bacterial        Primary Care Provider Office Phone # Fax #    [facility] [phone] [address] [place] [address]        Equal Access to Services     [facility] : Hadii aad ku hadasho Soomaali, waaxda luqadaha, qaybta kaalmada adeegyada, arlen yung . So [facility] [phone].    ATENCIÓN: Si habla español, tiene a mckinley disposición servicios gratuitos de asistencia lingüística. Ll[facility] [phone].    We comply with applicable federal civil rights laws and [place] laws. We do not discriminate on the basis of race, color, national origin, age, disability, sex, sexual orientation, or gender identity.            Thank you!     Thank you for choosing [facility]  for your care. Our goal is always to provide you with excellent care. Hearing back from our patients is one way we can continue to improve our services. Please take a few minutes to complete the written survey that you may receive in the mail after your visit with us. Thank you!             Your Updated Medication List - Protect others around you: Learn how to safely use, store and throw " away your medicines at www.disposemymeds.org.          This list is accurate as of 5/29/18  3:09 PM.  Always use your most recent med list.                   Brand Name Dispense Instructions for use Diagnosis    prenatal multivitamin plus iron 27-0.8 MG Tabs per tablet      Take 1 tablet by mouth daily

## 2018-05-29 NOTE — MR AVS SNAPSHOT
After Visit Summary   2018    Katrina Ward    MRN: [id number]           Patient Information     Date Of Birth          1995        Visit Information        Provider Department      2018 3:15 PM Francoise Denton MD [facility]        Today's Diagnoses     Threatened     -  1       Follow-ups after your visit        Your next 10 appointments already scheduled     May 31, 2018  1:15 PM CDT   New Prenatal with Francoise Denton MD   [facility] ([facility])    [address] [place] [id number] [phone]              Future tests that were ordered for you today     Open Future Orders        Priority Expected Expires Ordered    US OB < 14 Weeks Single Routine  2019            Who to contact     If you have questions or need follow up information about today's clinic visit or your schedule please contact [facility] directly at [phone].  Normal or non-critical lab and imaging results will be communicated to you by MyChart, letter or phone within 4 business days after the clinic has received the results. If you do not hear from us within 7 days, please contact the clinic through [facility] or phone. If you have a critical or abnormal lab result, we will notify you by phone as soon as possible.  Submit refill requests through [employer] or call your pharmacy and they will forward the refill request to us. Please allow 3 business days for your refill to be completed.          Additional Information About Your Visit        MyChart Information     [employer] gives you secure access to your electronic health record. If you see a primary care provider, you can also send messages to your care team and make appointments. If you have questions, please call your primary care clinic.  If you do not have a primary care provider, please call [phone] and they will assist you.        Care  EveryWhere ID     This is your Care EveryWhere ID. This could be used by other organizations to access your [place] medical records  [id number]         Blood Pressure from Last 3 Encounters:   05/29/18 117/74   02/05/18 124/78   12/06/17 128/64    Weight from Last 3 Encounters:   05/29/18 166 lb 6.4 oz (75.5 kg)   02/03/18 187 lb (84.8 kg)   06/18/17 160 lb (72.6 kg)               Primary Care Provider Office Phone # Fax #    [facility] [phone] [address] [place] [address]        Equal Access to Services     [employer] : Hadii jeni arredondoo Soflorina, waaxda luqadaha, qaybta kaalmada adeegyada, arlen dubon. So [facility] [phone].    ATENCIÓN: Si habla español, tiene a mckinley disposición servicios gratuitos de asistencia lingüística. Llame al [phone].    We comply with applicable federal civil rights laws and [place] laws. We do not discriminate on the basis of race, color, national origin, age, disability, sex, sexual orientation, or gender identity.            Thank you!     Thank you for choosing [facility]  for your care. Our goal is always to provide you with excellent care. Hearing back from our patients is one way we can continue to improve our services. Please take a few minutes to complete the written survey that you may receive in the mail after your visit with us. Thank you!             Your Updated Medication List - Protect others around you: Learn how to safely use, store and throw away your medicines at www.disposemymeds.org.          This list is accurate as of 5/29/18  5:41 PM.  Always use your most recent med list.                   Brand Name Dispense Instructions for use Diagnosis    prenatal multivitamin plus iron 27-0.8 MG Tabs per tablet      Take 1 tablet by mouth daily
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
## 2018-05-30 ENCOUNTER — TELEPHONE (OUTPATIENT)
Dept: OBGYN | Facility: CLINIC | Age: 23
End: 2018-05-30

## 2018-05-30 LAB
B-HCG SERPL-ACNC: ABNORMAL IU/L (ref 0–5)
BACTERIA SPEC CULT: NORMAL
HBV SURFACE AG SERPL QL IA: NONREACTIVE
HIV 1+2 AB+HIV1 P24 AG SERPL QL IA: NONREACTIVE
RUBV IGG SERPL IA-ACNC: 13 IU/ML
SPECIMEN SOURCE: NORMAL
T PALLIDUM AB SER QL: NONREACTIVE

## 2018-05-30 NOTE — TELEPHONE ENCOUNTER
Patient left yesterday without being seen, unable to wait any longer. Recommendations were follow up on ultrasound, recent ultrasound was 4/30/18 at [facility], measured 6 weeks, sac seen but no cardiac activity or fetal pole.  Ultrasound scheduled for 6/07/18 with NOB visit with Dr Renteria. Ordered a quant today. Called patient to inform her of the plan. She is aware of plan may change due to quant and may have to come in this week to repeat the quant to see if the numbers double. Patient understood. She is to call if any heavy bleeding or cramping or go to the ED.    Debby Cope LPN

## 2018-05-31 LAB
HGB A1 MFR BLD: 96.5 % (ref 95–97.9)
HGB A2 MFR BLD: 3 % (ref 2–3.5)
HGB C MFR BLD: 0 % (ref 0–0)
HGB E MFR BLD: 0 % (ref 0–0)
HGB F MFR BLD: 0.5 % (ref 0–2.1)
HGB FRACT BLD ELPH-IMP: NORMAL
HGB OTHER MFR BLD: 0 % (ref 0–0)
HGB S BLD QL SOLY: NORMAL
HGB S MFR BLD: 0 % (ref 0–0)
PATH INTERP BLD-IMP: NORMAL

## 2018-06-07 ENCOUNTER — RADIANT APPOINTMENT (OUTPATIENT)
Dept: ULTRASOUND IMAGING | Facility: CLINIC | Age: 23
End: 2018-06-07
Attending: OBSTETRICS & GYNECOLOGY

## 2018-06-07 DIAGNOSIS — O09.90 HIGH-RISK PREGNANCY, UNSPECIFIED TRIMESTER: ICD-10-CM

## 2018-06-07 PROCEDURE — 76801 OB US < 14 WKS SINGLE FETUS: CPT | Performed by: OBSTETRICS & GYNECOLOGY

## 2018-07-08 ENCOUNTER — HOSPITAL ENCOUNTER (EMERGENCY)
Facility: CLINIC | Age: 23
Discharge: HOME | End: 2018-07-09
Attending: EMERGENCY MEDICINE | Admitting: EMERGENCY MEDICINE

## 2018-07-08 ENCOUNTER — APPOINTMENT (OUTPATIENT)
Dept: ULTRASOUND IMAGING | Facility: CLINIC | Age: 23
End: 2018-07-08
Attending: EMERGENCY MEDICINE

## 2018-07-08 VITALS
OXYGEN SATURATION: 100 % | WEIGHT: 168.65 LBS | RESPIRATION RATE: 16 BRPM | SYSTOLIC BLOOD PRESSURE: 120 MMHG | HEART RATE: 70 BPM | BODY MASS INDEX: 30.85 KG/M2 | TEMPERATURE: 98 F | DIASTOLIC BLOOD PRESSURE: 69 MMHG

## 2018-07-08 DIAGNOSIS — O23.42 UTI (URINARY TRACT INFECTION) IN PREGNANCY IN SECOND TRIMESTER: ICD-10-CM

## 2018-07-08 DIAGNOSIS — B37.31 CANDIDIASIS OF VAGINA: ICD-10-CM

## 2018-07-08 DIAGNOSIS — O46.92 VAGINAL BLEEDING IN PREGNANCY, SECOND TRIMESTER: ICD-10-CM

## 2018-07-08 LAB
ABO + RH BLD: NORMAL
ABO + RH BLD: NORMAL
ALBUMIN UR-MCNC: NEGATIVE MG/DL
APPEARANCE UR: ABNORMAL
BASOPHILS # BLD AUTO: 0 10E9/L (ref 0–0.2)
BASOPHILS NFR BLD AUTO: 0.2 %
BILIRUB UR QL STRIP: NEGATIVE
COLOR UR AUTO: YELLOW
DIFFERENTIAL METHOD BLD: NORMAL
EOSINOPHIL # BLD AUTO: 0.1 10E9/L (ref 0–0.7)
EOSINOPHIL NFR BLD AUTO: 1.1 %
ERYTHROCYTE [DISTWIDTH] IN BLOOD BY AUTOMATED COUNT: 13.2 % (ref 10–15)
GLUCOSE UR STRIP-MCNC: NEGATIVE MG/DL
HCT VFR BLD AUTO: 38 % (ref 35–47)
HGB BLD-MCNC: 13 G/DL (ref 11.7–15.7)
HGB UR QL STRIP: NEGATIVE
IMM GRANULOCYTES # BLD: 0 10E9/L (ref 0–0.4)
IMM GRANULOCYTES NFR BLD: 0.2 %
KETONES UR STRIP-MCNC: NEGATIVE MG/DL
LEUKOCYTE ESTERASE UR QL STRIP: ABNORMAL
LYMPHOCYTES # BLD AUTO: 2 10E9/L (ref 0.8–5.3)
LYMPHOCYTES NFR BLD AUTO: 22.1 %
MCH RBC QN AUTO: 30.9 PG (ref 26.5–33)
MCHC RBC AUTO-ENTMCNC: 34.2 G/DL (ref 31.5–36.5)
MCV RBC AUTO: 90 FL (ref 78–100)
MONOCYTES # BLD AUTO: 0.8 10E9/L (ref 0–1.3)
MONOCYTES NFR BLD AUTO: 8.4 %
MUCOUS THREADS #/AREA URNS LPF: PRESENT /LPF
NEUTROPHILS # BLD AUTO: 6.2 10E9/L (ref 1.6–8.3)
NEUTROPHILS NFR BLD AUTO: 68 %
NITRATE UR QL: NEGATIVE
NRBC # BLD AUTO: 0 10*3/UL
NRBC BLD AUTO-RTO: 0 /100
PH UR STRIP: 6 PH (ref 5–7)
PLATELET # BLD AUTO: 220 10E9/L (ref 150–450)
RBC # BLD AUTO: 4.21 10E12/L (ref 3.8–5.2)
RBC #/AREA URNS AUTO: 8 /HPF (ref 0–2)
SOURCE: ABNORMAL
SP GR UR STRIP: 1.02 (ref 1–1.03)
SPECIMEN EXP DATE BLD: NORMAL
SPECIMEN SOURCE: ABNORMAL
SQUAMOUS #/AREA URNS AUTO: 4 /HPF (ref 0–1)
UROBILINOGEN UR STRIP-MCNC: 0 MG/DL (ref 0–2)
WBC # BLD AUTO: 9.1 10E9/L (ref 4–11)
WBC #/AREA URNS AUTO: 15 /HPF (ref 0–5)
WET PREP SPEC: ABNORMAL

## 2018-07-08 PROCEDURE — 87086 URINE CULTURE/COLONY COUNT: CPT | Performed by: EMERGENCY MEDICINE

## 2018-07-08 PROCEDURE — 87491 CHLMYD TRACH DNA AMP PROBE: CPT | Performed by: EMERGENCY MEDICINE

## 2018-07-08 PROCEDURE — 87210 SMEAR WET MOUNT SALINE/INK: CPT | Performed by: EMERGENCY MEDICINE

## 2018-07-08 PROCEDURE — 81001 URINALYSIS AUTO W/SCOPE: CPT | Performed by: EMERGENCY MEDICINE

## 2018-07-08 PROCEDURE — 87591 N.GONORRHOEAE DNA AMP PROB: CPT | Performed by: EMERGENCY MEDICINE

## 2018-07-08 PROCEDURE — 36415 COLL VENOUS BLD VENIPUNCTURE: CPT | Performed by: EMERGENCY MEDICINE

## 2018-07-08 PROCEDURE — 76815 OB US LIMITED FETUS(S): CPT

## 2018-07-08 PROCEDURE — 99284 EMERGENCY DEPT VISIT MOD MDM: CPT | Mod: 25

## 2018-07-08 PROCEDURE — 85025 COMPLETE CBC W/AUTO DIFF WBC: CPT | Performed by: EMERGENCY MEDICINE

## 2018-07-08 PROCEDURE — 86901 BLOOD TYPING SEROLOGIC RH(D): CPT | Performed by: EMERGENCY MEDICINE

## 2018-07-08 RX ORDER — CEPHALEXIN 500 MG/1
500 CAPSULE ORAL 3 TIMES DAILY
Qty: 21 CAPSULE | Refills: 0 | Status: SHIPPED | OUTPATIENT
Start: 2018-07-08 | End: 2018-07-15

## 2018-07-08 RX ORDER — CLOTRIMAZOLE 1 %
1 CREAM WITH APPLICATOR VAGINAL AT BEDTIME
Qty: 1 G | Refills: 0 | Status: SHIPPED | OUTPATIENT
Start: 2018-07-08 | End: 2018-07-15

## 2018-07-08 NOTE — ED AVS SNAPSHOT
[facility] Emergency Department    [address]    [place] [address]    Phone:  [phone]    Fax:  [phone]                                       Katrina Ward   MRN: [id number]    Department:  [facility] Emergency Department   Date of Visit:  7/8/2018           Patient Information     Date Of Birth          1995        Your diagnoses for this visit were:     UTI (urinary tract infection) in pregnancy in second trimester     Candidiasis of vagina     Vaginal bleeding in pregnancy, second trimester        You were seen by Russ Hastings MD.      Follow-up Information     Follow up with [facility], [place]. Go in 2 days.    Specialty:  Family Practice    Contact information:    [address] [place] [address]  [phone]          Discharge Instructions         Urinary Tract Infections in Women    Urinary tract infections (UTIs) are most often caused by bacteria. These bacteria enter the urinary tract. The bacteria may come from outside the body. Or they may travel from the skin outside the rectum or vagina into the urethra. Female anatomy makes it easy for bacteria from the bowel to enter a woman s urinary tract, which is the most common source of UTI. This means women develop UTIs more often than men. Pain in or around the urinary tract is a common UTI symptom. But the only way to know for sure if you have a UTI for the healthcare provider to test your urine. The two tests that may be done are the urinalysis and urine culture.  Types of UTIs    Cystitis. A bladder infection (cystitis) is the most common UTI in women. You may have urgent or frequent urination. You may also have pain, burning when you urinate, and bloody urine.    Urethritis. This is an inflamed urethra, which is the tube that carries urine from the bladder to outside the body. You may have lower stomach or back pain. You may also have urgent or frequent  urination.    Pyelonephritis. This is a kidney infection. If not treated, it can be serious and damage your kidneys. In severe cases, you may need to stay in the hospital. You may have a fever and lower back pain.  Medicines to treat a UTI  Most UTIs are treated with antibiotics. These kill the bacteria. The length of time you need to take them depends on the type of infection. It may be as short as 3 days. If you have repeated UTIs, you may need a low-dose antibiotic for several months. Take antibiotics exactly as directed. Don t stop taking them until all of the medicine is gone. If you stop taking the antibiotic too soon, the infection may not go away. You may also develop a resistance to the antibiotic. This can make it much harder to treat.  Lifestyle changes to treat and prevent UTIs  The lifestyle changes below will help get rid of your UTI. They may also help prevent future UTIs.    Drink plenty of fluids. This includes water, juice, or other caffeine-free drinks. Fluids help flush bacteria out of your body.    Empty your bladder. Always empty your bladder when you feel the urge to urinate. And always urinate before going to sleep. Urine that stays in your bladder can lead to infection. Try to urinate before and after sex as well.    Practice good personal hygiene. Wipe yourself from front to back after using the toilet. This helps keep bacteria from getting into the urethra.    Use condoms during sex. These help prevent UTIs caused by sexually transmitted bacteria. Also don't use spermicides during sex. These can increase the risk for UTIs. Choose other forms of birth control instead. For women who tend to get UTIs after sex, a low-dose of a preventive antibiotic may be used. Be sure to discuss this option with your healthcare provider.    Follow up with your healthcare provider as directed. He or she may test to make sure the infection has cleared. If needed, more treatment may be started.  Date Last  Reviewed: 1/1/2017 2000-2017 [employer]. [address] [place] [address], [place] [address]. All rights reserved. This information is not intended as a substitute for professional medical care. Always follow your healthcare professional's instructions.        Yeast Infection (Candida Vaginal Infection)    You have a Candida vaginal infection. This is also known as a yeast infection. It is most often caused by a type of yeast (fungus) called Candida. Candida are normally found in the vagina. But if they increase in number, this can lead to infection and cause symptoms.  Symptoms of a yeast infection can include:    Clumpy or thin, white discharge, which may look like cottage cheese    Itching or burning    Burning with urination  Certain factors can make a yeast infection more likely. These can include:    Taking certain medicines, such as antibiotics or birth control pills    Pregnancy    Diabetes    Weak immune system  A yeast infection is most often treated with antifungal medicine. This may be given as a vaginal cream or pills you take by mouth. Treatment may last for about 1 to 7 days. Women with severe or recurrent infections may need longer courses of treatment.  Home care    If you re prescribed medicine, be sure to use it as directed. Finish all of the medicine, even if your symptoms go away. Note: Don t try to treat yourself using over-the-counter products without talking to your provider first. He or she will let you know if this is a good option for you.    Ask your provider what steps you can take to help reduce your risk of having a yeast infection in the future.  Follow-up care  Follow up with your healthcare provider, or as directed.  When to seek medical advice  Call your healthcare provider right away if:    You have a fever of 100.4 F (38 C) or higher, or as directed by your provider.    Your symptoms worsen, or they don t go away within a few days of starting treatment.    You have new  pain in the lower belly or pelvic region.    You have side effects that bother you or a reaction to the cream or pills you re prescribed.    You or any partners you have sex with have new symptoms, such as a rash, joint pain, or sores.  Date Last Reviewed: 10/1/2017    6653-4885 [employer]. [address] [place] [address]. All rights reserved. This information is not intended as a substitute for professional medical care. Always follow your healthcare professional's instructions.          24 Hour Appointment Hotline       To make an appointment at any [facility], call [phone] ([phone]). If you don't have a family doctor or clinic, we will help you find one. [facility] clinics are conveniently located to serve the needs of you and your family.             Review of your medicines      START taking        Dose / Directions Last dose taken    cephALEXin 500 MG capsule   Commonly known as:  KEFLEX   Dose:  500 mg   Quantity:  21 capsule        Take 1 capsule (500 mg) by mouth 3 times daily for 7 days   Refills:  0        clotrimazole 1 % cream   Commonly known as:  LOTRIMIN   Dose:  1 Applicatorful   Quantity:  1 g        Place 1 Applicatorful vaginally At Bedtime for 7 days   Refills:  0          Our records show that you are taking the medicines listed below. If these are incorrect, please call your family doctor or clinic.        Dose / Directions Last dose taken    prenatal multivitamin plus iron 27-0.8 MG Tabs per tablet   Dose:  1 tablet        Take 1 tablet by mouth daily   Refills:  0                Prescriptions were sent or printed at these locations (2 Prescriptions)                   Other Prescriptions                Printed at Department/Unit printer (2 of 2)         cephALEXin (KEFLEX) 500 MG capsule               clotrimazole (LOTRIMIN) 1 % cream                Procedures and tests performed during your visit     CBC with platelets differential    Chlamydia  trachomatis PCR    Neisseria gonorrhoea PCR    Prep for procedure - pelvic exam    Rh type    UA with Microscopic    US OB Limited One Or More Fetuses    Urine Culture Aerobic Bacterial    Wet prep      Orders Needing Specimen Collection     None      Pending Results     Date and Time Order Name Status Description    7/8/2018 2306 Urine Culture Aerobic Bacterial In process     7/8/2018 2011 Neisseria gonorrhoea PCR In process     7/8/2018 2011 Chlamydia trachomatis PCR In process             Pending Culture Results     Date and Time Order Name Status Description    7/8/2018 2306 Urine Culture Aerobic Bacterial In process     7/8/2018 2011 Neisseria gonorrhoea PCR In process     7/8/2018 2011 Chlamydia trachomatis PCR In process             Pending Results Instructions     If you had any lab results that were not finalized at the time of your Discharge, you can call the ED Lab Result RN at [phone]. You will be contacted by this team for any positive Lab results or changes in treatment. The nurses are available 7 days a week from 10A to 6:30P.  You can leave a message 24 hours per day and they will return your call.        Test Results From Your Hospital Stay        7/8/2018 11:32 PM      Component Results     Component    Specimen Description    Vagina    Wet Prep    Many  PMNs seen      Wet Prep (Abnormal)    Few  Yeast seen      Wet Prep    No Trichomonas seen    Wet Prep    No clue cells seen         7/8/2018 11:14 PM         7/8/2018 11:14 PM         7/8/2018  9:03 PM      Component Results     Component Value Ref Range & Units Status    Color Urine Yellow  Final    Appearance Urine Slightly Cloudy  Final    Glucose Urine Negative NEG^Negative mg/dL Final    Bilirubin Urine Negative NEG^Negative Final    Ketones Urine Negative NEG^Negative mg/dL Final    Specific Gravity Urine 1.019 1.003 - 1.035 Final    Blood Urine Negative NEG^Negative Final    pH Urine 6.0 5.0 - 7.0 pH Final    Protein Albumin Urine  Negative NEG^Negative mg/dL Final    Urobilinogen mg/dL 0.0 0.0 - 2.0 mg/dL Final    Nitrite Urine Negative NEG^Negative Final    Leukocyte Esterase Urine Large (A) NEG^Negative Final    Source Midstream Urine  Final    WBC Urine 15 (H) 0 - 5 /HPF Final    RBC Urine 8 (H) 0 - 2 /HPF Final    Squamous Epithelial /HPF Urine 4 (H) 0 - 1 /HPF Final    Mucous Urine Present (A) NEG^Negative /LPF Final               7/8/2018  9:25 PM      Component Results     Component Value Ref Range & Units Status    WBC 9.1 4.0 - 11.0 10e9/L Final    RBC Count 4.21 3.8 - 5.2 10e12/L Final    Hemoglobin 13.0 11.7 - 15.7 g/dL Final    Hematocrit 38.0 35.0 - 47.0 % Final    MCV 90 78 - 100 fl Final    MCH 30.9 26.5 - 33.0 pg Final    MCHC 34.2 31.5 - 36.5 g/dL Final    RDW 13.2 10.0 - 15.0 % Final    Platelet Count 220 150 - 450 10e9/L Final    Diff Method Automated Method  Final    % Neutrophils 68.0 % Final    % Lymphocytes 22.1 % Final    % Monocytes 8.4 % Final    % Eosinophils 1.1 % Final    % Basophils 0.2 % Final    % Immature Granulocytes 0.2 % Final    Nucleated RBCs 0 0 /100 Final    Absolute Neutrophil 6.2 1.6 - 8.3 10e9/L Final    Absolute Lymphocytes 2.0 0.8 - 5.3 10e9/L Final    Absolute Monocytes 0.8 0.0 - 1.3 10e9/L Final    Absolute Eosinophils 0.1 0.0 - 0.7 10e9/L Final    Absolute Basophils 0.0 0.0 - 0.2 10e9/L Final    Abs Immature Granulocytes 0.0 0 - 0.4 10e9/L Final    Absolute Nucleated RBC 0.0  Final         7/8/2018 10:01 PM      Component Results     Component    ABO    AB    RH(D)    Pos    Specimen Expires    07/11/2018 7/8/2018 11:02 PM      Narrative     US OB LIMITED ONE OR MORE FETUSES  7/8/2018 10:49 PM    HISTORY: Bleeding, 15 weeks.     COMPARISON: OB ultrasound dated 6/7/2018.    FINDINGS:     Presentation: Breech.  Cardiac activity: 149 bpm. Regular rhythm.  Movement: Unremarkable.  Placenta: Anterior. No evidence for placenta previa.  Adnexa: Not visualized.   Cervical length: 3.0 cm.    Amniotic fluid: Unremarkable. MARK: 6.7 cm.     Other findings: None.  A complete anatomy scan was not performed.     Measured parameters:       BPD:  3.3 cm      Age: 16 weeks 2 days.       HC:    12.4 cm      Age: 16 weeks 2 days.       AC:  9.7 cm      Age: 15 weeks 6 days.       FL:   2.0 cm      Age: 16 weeks 0 days.    Gestational age by current ultrasound measurement: 16 weeks 1 day,  corresponding to an PATRICIA of 12/22/2018.    Gestational age based on the reported previously established due date:  15 weeks 6 days, corresponding to an PATRICIA of 12/24/2018.    Estimated fetal weight: 138 grams, corresponding to the 70th  percentile based on the reported previously established due date.         Impression     IMPRESSION:    1. Single live intrauterine pregnancy of 16 weeks 1 day gestation by  current ultrasound measurement. Fetal growth is 2 days more advanced  than what is expected from the reported previously established due  date.  2. No evidence for subchorionic hemorrhage is identified.  3. Otherwise unremarkable limited obstetric ultrasound.     NIKOLAY CHAVEZ MD         7/8/2018 11:12 PM                Clinical Quality Measure: Blood Pressure Screening     Your blood pressure was checked while you were in the emergency department today. The last reading we obtained was  BP: 120/69 . Please read the guidelines below about what these numbers mean and what you should do about them.  If your systolic blood pressure (the top number) is less than 120 and your diastolic blood pressure (the bottom number) is less than 80, then your blood pressure is normal. There is nothing more that you need to do about it.  If your systolic blood pressure (the top number) is 120-139 or your diastolic blood pressure (the bottom number) is 80-89, your blood pressure may be higher than it should be. You should have your blood pressure rechecked within a year by a primary care provider.  If your systolic blood pressure (the top number)  is 140 or greater or your diastolic blood pressure (the bottom number) is 90 or greater, you may have high blood pressure. High blood pressure is treatable, but if left untreated over time it can put you at risk for heart attack, stroke, or kidney failure. You should have your blood pressure rechecked by a primary care provider within the next 4 weeks.  If your provider in the emergency department today gave you specific instructions to follow-up with your doctor or provider even sooner than that, you should follow that instruction and not wait for up to 4 weeks for your follow-up visit.        Thank you for choosing [facility]       Thank you for choosing [facility] for your care. Our goal is always to provide you with excellent care. Hearing back from our patients is one way we can continue to improve our services. Please take a few minutes to complete the written survey that you may receive in the mail after you visit with us. Thank you!        [facility] Information     [employer] gives you secure access to your electronic health record. If you see a primary care provider, you can also send messages to your care team and make appointments. If you have questions, please call your primary care clinic.  If you do not have a primary care provider, please call [phone] and they will assist you.        Care EveryWhere ID     This is your Care EveryWhere ID. This could be used by other organizations to access your [facility] medical records  [id number]        Equal Access to Services     COLE MOORE : Estefany Guy, wanelda lincoln, qaybta kaalarlen araiza. So [facility] [phone].    ATENCIÓN: Si habla español, tiene a mckinley disposición servicios gratuitos de asistencia lingüística. Llame al [phone].    We comply with applicable federal civil rights laws and [place] laws. We do not discriminate on the basis of race, color, national origin, age, disability, sex,  sexual orientation, or gender identity.            After Visit Summary       This is your record. Keep this with you and show to your community pharmacist(s) and doctor(s) at your next visit.

## 2018-07-08 NOTE — ED AVS SNAPSHOT
[facility] Emergency Department    [address]    [facility] [address]    Phone:  [phone]    Fax:  [phone]                                       Katrina Ward   MRN: [id number]    Department:  [facility] Emergency Department   Date of Visit:  7/8/2018           After Visit Summary Signature Page     I have received my discharge instructions, and my questions have been answered. I have discussed any challenges I see with this plan with the nurse or doctor.    ..........................................................................................................................................  Patient/Patient Representative Signature      ..........................................................................................................................................  Patient Representative Print Name and Relationship to Patient    ..................................................               ................................................  Date                                            Time    ..........................................................................................................................................  Reviewed by Signature/Title    ...................................................              ..............................................  Date                                                            Time

## 2018-07-08 NOTE — LETTER
July 8, 2018      To Whom It May Concern:      Katrina Ward was seen in our Emergency Department today, 07/08/18.  I expect her condition to improve over the next days.  She may return to work/school 07/10/2018. .    Sincerely,      [facility] Emergency Room

## 2018-07-09 NOTE — ED PROVIDER NOTES
History     Chief Complaint:  Pelvic Cramping & vaginal Bleeding    HPI   Katrina aWrd is a  22 year old female who presents to the ED for evaluation of pelvic cramping and vaginal bleeding. The patient reports that she is currently about 4 months pregnant, and and is due in . She has been having increased abdominal cramping over the past week. Today, around 1500 she additionally developed some vaginal spotting, prompting her to visit the ER. She denies any clots in her vaginal bleeding. Of note, she has a history of pre-eclampsia.  She had an early US, though does not have her next OB appointment set up for a repeat US. She denies any other concerns here today in the ER, though does note some recent cloudy urine.    Allergies:  NKDA    Medications:    Prenatal Iron    Past Medical History:    Pre-ecclampsia  Scoliosis  Substance abuse  Anxiety    Past Surgical History:    The patient does not have any pertinent past surgical history.    Family History:    HTN    Social History:  Marital Status:  Single [1]  Smoke: pack/day  Smokeless: never used  Alcohol: yes    Review of Systems   Genitourinary: Positive for pelvic pain and vaginal bleeding.   All other systems reviewed and are negative.    Physical Exam     Patient Vitals for the past 24 hrs:   BP Temp Temp src Pulse Heart Rate Resp SpO2 Weight   18 2327 120/69 - - 70 - 16 100 % -   18 2215 124/76 - - 80 - 16 99 % -   18 1951 117/77 98  F (36.7  C) Temporal 88 88 18 99 % 76.5 kg (168 lb 10.4 oz)     Physical Exam   Constitutional: She appears well-developed and well-nourished.   HENT:   Right Ear: External ear normal.   Left Ear: External ear normal.   Mouth/Throat: Oropharynx is clear and moist. No oropharyngeal exudate.   TM's clear bilaterally   Eyes: Conjunctivae are normal. Pupils are equal, round, and reactive to light. No scleral icterus.   Neck: Normal range of motion. Neck supple.   Cardiovascular: Normal rate,  regular rhythm, normal heart sounds and intact distal pulses.  Exam reveals no gallop and no friction rub.    No murmur heard.  Pulmonary/Chest: Effort normal and breath sounds normal. No respiratory distress. She has no wheezes. She has no rales.   Abdominal: Soft. Bowel sounds are normal. She exhibits no distension and no mass. There is no tenderness.   Genitourinary: Vaginal discharge (yellowish) found.   Genitourinary Comments: No vaginal bleeding. No CMT. No uterine TTP. Some yellowish discharge   Musculoskeletal: She exhibits no edema.   Neurological: She is alert.   Skin: Skin is warm and dry. No rash noted.   Psychiatric: She has a normal mood and affect.     Emergency Department Course     Imaging:  Radiographic findings were communicated with the patient who voiced understanding of the findings.  US OB, Limited One or More Fetuses:   1. Single live intrauterine pregnancy of 16 weeks 1 day gestation by current ultrasound measurement. Fetal growth is 2 days more advanced than what is expected from the reported previously established due date.  2. No evidence for subchorionic hemorrhage is identified.  3. Otherwise unremarkable limited obstetric ultrasound, as per radiology.     Laboratory:  CBC: WBC: 9.1, HGB: 13.0, PLT: 220    UA with Microscopic: leukocyte esterase large (A), WBC 15 (H), RBC 8 (H), SE 4 (H), mucous present (A)o/w WNL  Urine culture: pending    RH: AB positive    Wet prep: few yeast seen (A), o/w normal    Chlamydia trachomatis: pending  Neisseria gonorrhoea: pending    Emergency Department Course:  Nursing notes and vitals reviewed. (2005) I performed an exam of the patient as documented above.     Blood drawn. This was sent to the lab for further testing, results above.    The patient provided a urine sample here in the emergency department. This was sent for laboratory testing, findings above.     The patient was sent for a OB US while in the emergency department, findings above.      (0946) I rechecked the patient and discussed the results of her workup thus far.     Findings and plan explained to the Patient. Patient discharged home with instructions regarding supportive care, medications, and reasons to return. The importance of close follow-up was reviewed. The patient was prescribed Keflex and Lotrimin.    I personally reviewed the laboratory results with the Patient and answered all related questions prior to discharge.     Impression & Plan      Medical Decision Making:  Katrina Ward is a 22 year old female who presents with spotting as well as possible concern for UTI. She is 15 weeks pregnant. She does have a mild UTI. We sent out a urine culture. I put her on keflex as well. US did not show any concerning findings. No bleeding was seen. Evaluation did reveal yeast on the wet prep, therefore we will treat her for a vaginal infection. She was given Keflex as well as a clotrimazole applicator. She will follow up with her OB in her next two days if her symptoms worsen or if she has severe pain and/or heavy bleeding.    Diagnosis:    ICD-10-CM   1. UTI (urinary tract infection) in pregnancy in second trimester O23.42   2. Candidiasis of vagina B37.3   3. Vaginal bleeding in pregnancy, second trimester O46.92     Disposition:  discharged to home    Discharge Medications:  New Prescriptions    CEPHALEXIN (KEFLEX) 500 MG CAPSULE    Take 1 capsule (500 mg) by mouth 3 times daily for 7 days    CLOTRIMAZOLE (LOTRIMIN) 1 % CREAM    Place 1 Applicatorful vaginally At Bedtime for 7 days     Scribe Disclosure:  I, Preston Walter, am serving as a scribe on 7/8/2018 at 8:07 PM to personally document services performed by Russ Hastings MD based on my observations and the provider's statements to me.     Preston Walter  7/8/2018   [facility] EMERGENCY DEPARTMENT       Russ Hastings MD  07/09/18 0014

## 2018-07-09 NOTE — DISCHARGE INSTRUCTIONS
Urinary Tract Infections in Women    Urinary tract infections (UTIs) are most often caused by bacteria. These bacteria enter the urinary tract. The bacteria may come from outside the body. Or they may travel from the skin outside the rectum or vagina into the urethra. Female anatomy makes it easy for bacteria from the bowel to enter a woman s urinary tract, which is the most common source of UTI. This means women develop UTIs more often than men. Pain in or around the urinary tract is a common UTI symptom. But the only way to know for sure if you have a UTI for the healthcare provider to test your urine. The two tests that may be done are the urinalysis and urine culture.  Types of UTIs    Cystitis. A bladder infection (cystitis) is the most common UTI in women. You may have urgent or frequent urination. You may also have pain, burning when you urinate, and bloody urine.    Urethritis. This is an inflamed urethra, which is the tube that carries urine from the bladder to outside the body. You may have lower stomach or back pain. You may also have urgent or frequent urination.    Pyelonephritis. This is a kidney infection. If not treated, it can be serious and damage your kidneys. In severe cases, you may need to stay in the hospital. You may have a fever and lower back pain.  Medicines to treat a UTI  Most UTIs are treated with antibiotics. These kill the bacteria. The length of time you need to take them depends on the type of infection. It may be as short as 3 days. If you have repeated UTIs, you may need a low-dose antibiotic for several months. Take antibiotics exactly as directed. Don t stop taking them until all of the medicine is gone. If you stop taking the antibiotic too soon, the infection may not go away. You may also develop a resistance to the antibiotic. This can make it much harder to treat.  Lifestyle changes to treat and prevent UTIs  The lifestyle changes below will help get rid of your UTI. They  may also help prevent future UTIs.    Drink plenty of fluids. This includes water, juice, or other caffeine-free drinks. Fluids help flush bacteria out of your body.    Empty your bladder. Always empty your bladder when you feel the urge to urinate. And always urinate before going to sleep. Urine that stays in your bladder can lead to infection. Try to urinate before and after sex as well.    Practice good personal hygiene. Wipe yourself from front to back after using the toilet. This helps keep bacteria from getting into the urethra.    Use condoms during sex. These help prevent UTIs caused by sexually transmitted bacteria. Also don't use spermicides during sex. These can increase the risk for UTIs. Choose other forms of birth control instead. For women who tend to get UTIs after sex, a low-dose of a preventive antibiotic may be used. Be sure to discuss this option with your healthcare provider.    Follow up with your healthcare provider as directed. He or she may test to make sure the infection has cleared. If needed, more treatment may be started.  Date Last Reviewed: 1/1/2017 2000-2017 [employer]. [address] [place] [address]. All rights reserved. This information is not intended as a substitute for professional medical care. Always follow your healthcare professional's instructions.        Yeast Infection (Candida Vaginal Infection)    You have a Candida vaginal infection. This is also known as a yeast infection. It is most often caused by a type of yeast (fungus) called Candida. Candida are normally found in the vagina. But if they increase in number, this can lead to infection and cause symptoms.  Symptoms of a yeast infection can include:    Clumpy or thin, white discharge, which may look like cottage cheese    Itching or burning    Burning with urination  Certain factors can make a yeast infection more likely. These can include:    Taking certain medicines, such as antibiotics  or birth control pills    Pregnancy    Diabetes    Weak immune system  A yeast infection is most often treated with antifungal medicine. This may be given as a vaginal cream or pills you take by mouth. Treatment may last for about 1 to 7 days. Women with severe or recurrent infections may need longer courses of treatment.  Home care    If you re prescribed medicine, be sure to use it as directed. Finish all of the medicine, even if your symptoms go away. Note: Don t try to treat yourself using over-the-counter products without talking to your provider first. He or she will let you know if this is a good option for you.    Ask your provider what steps you can take to help reduce your risk of having a yeast infection in the future.  Follow-up care  Follow up with your healthcare provider, or as directed.  When to seek medical advice  Call your healthcare provider right away if:    You have a fever of 100.4 F (38 C) or higher, or as directed by your provider.    Your symptoms worsen, or they don t go away within a few days of starting treatment.    You have new pain in the lower belly or pelvic region.    You have side effects that bother you or a reaction to the cream or pills you re prescribed.    You or any partners you have sex with have new symptoms, such as a rash, joint pain, or sores.  Date Last Reviewed: 10/1/2017    7093-6634 [employer]. [address] [place] [address], [place] [address]. All rights reserved. This information is not intended as a substitute for professional medical care. Always follow your healthcare professional's instructions.

## 2018-07-10 LAB
BACTERIA SPEC CULT: NORMAL
C TRACH DNA SPEC QL NAA+PROBE: NEGATIVE
Lab: NORMAL
N GONORRHOEA DNA SPEC QL NAA+PROBE: NEGATIVE
SPECIMEN SOURCE: NORMAL

## 2020-03-02 ENCOUNTER — HEALTH MAINTENANCE LETTER (OUTPATIENT)
Age: 25
End: 2020-03-02

## 2020-12-10 ENCOUNTER — HOSPITAL ENCOUNTER (EMERGENCY)
Facility: CLINIC | Age: 25
Discharge: HOME | End: 2020-12-10
Attending: EMERGENCY MEDICINE | Admitting: EMERGENCY MEDICINE
Payer: COMMERCIAL

## 2020-12-10 ENCOUNTER — APPOINTMENT (OUTPATIENT)
Dept: ULTRASOUND IMAGING | Facility: CLINIC | Age: 25
End: 2020-12-10
Attending: EMERGENCY MEDICINE
Payer: COMMERCIAL

## 2020-12-10 VITALS
SYSTOLIC BLOOD PRESSURE: 126 MMHG | TEMPERATURE: 99.2 F | HEART RATE: 114 BPM | OXYGEN SATURATION: 98 % | DIASTOLIC BLOOD PRESSURE: 72 MMHG | RESPIRATION RATE: 12 BRPM

## 2020-12-10 DIAGNOSIS — R51.9 NONINTRACTABLE HEADACHE, UNSPECIFIED CHRONICITY PATTERN, UNSPECIFIED HEADACHE TYPE: ICD-10-CM

## 2020-12-10 DIAGNOSIS — F17.200 TOBACCO USE DISORDER: ICD-10-CM

## 2020-12-10 DIAGNOSIS — O99.331 TOBACCO SMOKING COMPLICATING PREGNANCY IN FIRST TRIMESTER: ICD-10-CM

## 2020-12-10 DIAGNOSIS — Z3A.12 12 WEEKS GESTATION OF PREGNANCY: ICD-10-CM

## 2020-12-10 DIAGNOSIS — O26.891 OTHER SPECIFIED PREGNANCY RELATED CONDITIONS, FIRST TRIMESTER: ICD-10-CM

## 2020-12-10 DIAGNOSIS — R51.9 FACIAL PAIN: ICD-10-CM

## 2020-12-10 DIAGNOSIS — O20.8 OTHER HEMORRHAGE IN EARLY PREGNANCY: ICD-10-CM

## 2020-12-10 DIAGNOSIS — R10.2 PELVIC PAIN IN PREGNANCY: ICD-10-CM

## 2020-12-10 DIAGNOSIS — O26.899 PELVIC PAIN IN PREGNANCY: ICD-10-CM

## 2020-12-10 LAB
ALBUMIN SERPL-MCNC: 3.2 G/DL (ref 3.4–5)
ALBUMIN UR-MCNC: 10 MG/DL
ALP SERPL-CCNC: 41 U/L (ref 40–150)
ALT SERPL W P-5'-P-CCNC: 16 U/L (ref 0–50)
ANION GAP SERPL CALCULATED.3IONS-SCNC: 6 MMOL/L (ref 3–14)
APPEARANCE UR: ABNORMAL
AST SERPL W P-5'-P-CCNC: 8 U/L (ref 0–45)
B-HCG SERPL-ACNC: 9700 IU/L (ref 0–5)
BACTERIA #/AREA URNS HPF: ABNORMAL /HPF
BASOPHILS # BLD AUTO: 0 10E9/L (ref 0–0.2)
BASOPHILS NFR BLD AUTO: 0.3 %
BILIRUB SERPL-MCNC: 0.3 MG/DL (ref 0.2–1.3)
BILIRUB UR QL STRIP: NEGATIVE
BUN SERPL-MCNC: 10 MG/DL (ref 7–30)
CALCIUM SERPL-MCNC: 8 MG/DL (ref 8.5–10.1)
CHLORIDE SERPL-SCNC: 106 MMOL/L (ref 94–109)
CO2 SERPL-SCNC: 26 MMOL/L (ref 20–32)
COLOR UR AUTO: YELLOW
CREAT SERPL-MCNC: 0.5 MG/DL (ref 0.52–1.04)
DIFFERENTIAL METHOD BLD: NORMAL
EOSINOPHIL # BLD AUTO: 0.1 10E9/L (ref 0–0.7)
EOSINOPHIL NFR BLD AUTO: 1.2 %
ERYTHROCYTE [DISTWIDTH] IN BLOOD BY AUTOMATED COUNT: 13.3 % (ref 10–15)
GFR SERPL CREATININE-BSD FRML MDRD: >90 ML/MIN/{1.73_M2}
GLUCOSE SERPL-MCNC: 82 MG/DL (ref 70–99)
GLUCOSE UR STRIP-MCNC: NEGATIVE MG/DL
HCG UR QL: POSITIVE
HCT VFR BLD AUTO: 39 % (ref 35–47)
HGB BLD-MCNC: 13.2 G/DL (ref 11.7–15.7)
HGB UR QL STRIP: NEGATIVE
IMM GRANULOCYTES # BLD: 0 10E9/L (ref 0–0.4)
IMM GRANULOCYTES NFR BLD: 0.3 %
KETONES UR STRIP-MCNC: NEGATIVE MG/DL
LEUKOCYTE ESTERASE UR QL STRIP: NEGATIVE
LYMPHOCYTES # BLD AUTO: 2.3 10E9/L (ref 0.8–5.3)
LYMPHOCYTES NFR BLD AUTO: 25.2 %
MCH RBC QN AUTO: 30.6 PG (ref 26.5–33)
MCHC RBC AUTO-ENTMCNC: 33.8 G/DL (ref 31.5–36.5)
MCV RBC AUTO: 90 FL (ref 78–100)
MONOCYTES # BLD AUTO: 0.6 10E9/L (ref 0–1.3)
MONOCYTES NFR BLD AUTO: 6.2 %
MUCOUS THREADS #/AREA URNS LPF: PRESENT /LPF
NEUTROPHILS # BLD AUTO: 6.1 10E9/L (ref 1.6–8.3)
NEUTROPHILS NFR BLD AUTO: 66.8 %
NITRATE UR QL: POSITIVE
NRBC # BLD AUTO: 0 10*3/UL
NRBC BLD AUTO-RTO: 0 /100
PH UR STRIP: 6 PH (ref 5–7)
PLATELET # BLD AUTO: 252 10E9/L (ref 150–450)
POTASSIUM SERPL-SCNC: 3.7 MMOL/L (ref 3.4–5.3)
PROT SERPL-MCNC: 6.6 G/DL (ref 6.8–8.8)
RBC # BLD AUTO: 4.32 10E12/L (ref 3.8–5.2)
RBC #/AREA URNS AUTO: 1 /HPF (ref 0–2)
SODIUM SERPL-SCNC: 138 MMOL/L (ref 133–144)
SOURCE: ABNORMAL
SP GR UR STRIP: 1.02 (ref 1–1.03)
SPECIMEN SOURCE: NORMAL
SQUAMOUS #/AREA URNS AUTO: 5 /HPF (ref 0–1)
UROBILINOGEN UR STRIP-MCNC: NORMAL MG/DL (ref 0–2)
WBC # BLD AUTO: 9.1 10E9/L (ref 4–11)
WBC #/AREA URNS AUTO: 3 /HPF (ref 0–5)
WET PREP SPEC: NORMAL

## 2020-12-10 PROCEDURE — 87210 SMEAR WET MOUNT SALINE/INK: CPT | Performed by: EMERGENCY MEDICINE

## 2020-12-10 PROCEDURE — 250N000011 HC RX IP 250 OP 636: Performed by: EMERGENCY MEDICINE

## 2020-12-10 PROCEDURE — 87591 N.GONORRHOEAE DNA AMP PROB: CPT | Performed by: EMERGENCY MEDICINE

## 2020-12-10 PROCEDURE — 96361 HYDRATE IV INFUSION ADD-ON: CPT

## 2020-12-10 PROCEDURE — 84702 CHORIONIC GONADOTROPIN TEST: CPT | Performed by: EMERGENCY MEDICINE

## 2020-12-10 PROCEDURE — 99284 EMERGENCY DEPT VISIT MOD MDM: CPT | Performed by: EMERGENCY MEDICINE

## 2020-12-10 PROCEDURE — 96374 THER/PROPH/DIAG INJ IV PUSH: CPT

## 2020-12-10 PROCEDURE — 81001 URINALYSIS AUTO W/SCOPE: CPT | Performed by: EMERGENCY MEDICINE

## 2020-12-10 PROCEDURE — 76801 OB US < 14 WKS SINGLE FETUS: CPT

## 2020-12-10 PROCEDURE — 81025 URINE PREGNANCY TEST: CPT | Performed by: EMERGENCY MEDICINE

## 2020-12-10 PROCEDURE — 99284 EMERGENCY DEPT VISIT MOD MDM: CPT | Mod: 25

## 2020-12-10 PROCEDURE — 258N000003 HC RX IP 258 OP 636: Performed by: EMERGENCY MEDICINE

## 2020-12-10 PROCEDURE — 87086 URINE CULTURE/COLONY COUNT: CPT | Performed by: EMERGENCY MEDICINE

## 2020-12-10 PROCEDURE — 85025 COMPLETE CBC W/AUTO DIFF WBC: CPT | Performed by: EMERGENCY MEDICINE

## 2020-12-10 PROCEDURE — 87491 CHLMYD TRACH DNA AMP PROBE: CPT | Performed by: EMERGENCY MEDICINE

## 2020-12-10 PROCEDURE — 87186 SC STD MICRODIL/AGAR DIL: CPT | Performed by: EMERGENCY MEDICINE

## 2020-12-10 PROCEDURE — 87088 URINE BACTERIA CULTURE: CPT | Performed by: EMERGENCY MEDICINE

## 2020-12-10 PROCEDURE — 80053 COMPREHEN METABOLIC PANEL: CPT | Performed by: EMERGENCY MEDICINE

## 2020-12-10 RX ORDER — METOCLOPRAMIDE HYDROCHLORIDE 5 MG/ML
5 INJECTION INTRAMUSCULAR; INTRAVENOUS ONCE
Status: COMPLETED | OUTPATIENT
Start: 2020-12-10 | End: 2020-12-10

## 2020-12-10 RX ADMIN — METOCLOPRAMIDE HYDROCHLORIDE 5 MG: 5 INJECTION INTRAMUSCULAR; INTRAVENOUS at 20:12

## 2020-12-10 RX ADMIN — SODIUM CHLORIDE 1000 ML: 9 INJECTION, SOLUTION INTRAVENOUS at 20:12

## 2020-12-10 ASSESSMENT — ENCOUNTER SYMPTOMS
HEADACHES: 1
ABDOMINAL PAIN: 0
ARTHRALGIAS: 0
DIFFICULTY URINATING: 0
SHORTNESS OF BREATH: 0
CONFUSION: 0
NECK STIFFNESS: 0
EYE REDNESS: 0
FEVER: 0
COLOR CHANGE: 0
NECK PAIN: 1

## 2020-12-10 NOTE — ED TRIAGE NOTES
Pt states having lower bilat abd cramping pain, HA for the past week and neck stiffness that started the other day waking up one morning, Pt states she thinks she is about 12weeks preg and had a pos preg test the first of Oct.  Pt has a hx of pre-eclampsia .

## 2020-12-10 NOTE — ED AVS SNAPSHOT
[facility] Emergency Department  [address]  [place] [address]  Phone: [phone]  Fax: [phone]                                    Katrina Ward   MRN: [id number]    Department: [facility] Emergency Department   Date of Visit: 12/10/2020           After Visit Summary Signature Page    I have received my discharge instructions, and my questions have been answered. I have discussed any challenges I see with this plan with the nurse or doctor.    ..........................................................................................................................................  Patient/Patient Representative Signature      ..........................................................................................................................................  Patient Representative Print Name and Relationship to Patient    ..................................................               ................................................  Date                                   Time    ..........................................................................................................................................  Reviewed by Signature/Title    ...................................................              ..............................................  Date                                               Time          22EPIC Rev 08/18

## 2020-12-11 ENCOUNTER — TELEPHONE (OUTPATIENT)
Dept: EMERGENCY MEDICINE | Facility: CLINIC | Age: 25
End: 2020-12-11

## 2020-12-11 DIAGNOSIS — N39.0 URINARY TRACT INFECTION: ICD-10-CM

## 2020-12-11 LAB
BACTERIA SPEC CULT: ABNORMAL
C TRACH DNA SPEC QL NAA+PROBE: NEGATIVE
Lab: ABNORMAL
N GONORRHOEA DNA SPEC QL NAA+PROBE: NEGATIVE
SPECIMEN SOURCE: ABNORMAL
SPECIMEN SOURCE: NORMAL
SPECIMEN SOURCE: NORMAL

## 2020-12-11 NOTE — DISCHARGE INSTRUCTIONS
Take acetaminophen as needed for pain.  Apply ice to sore muscles for 20 minutes at a time, 3-4 times per day for the next 2 days.  Transition to heat thereafter.    Follow-up with your clinic as planned.    Return if worsening symptoms or other concerns.

## 2020-12-11 NOTE — TELEPHONE ENCOUNTER
Virtual 3-D Display for Smartphones [facility] Emergency Department Lab result notification [Adult-Female]    [facility] ED lab result protocol used  Urine Culture    Reason for call  Notify of lab results, assess symptoms,  review ED providers recommendations/discharge instructions (if necessary) and advise per ED lab result f/u protocol    Lab Result (including Rx patient on, if applicable)  Patient notified of lab result and treatment recommendations.  Rx for Cephalexin (Keflex) 500 mg capsule, 1 capsule (500 mg) by mouth 2 times daily for 3 days. sent to .  RN reviewed information about medication being prescribed based on preliminary findings in the culture and changes may be necessary when culture report finalizes.  If changes are needed, Patient will be contacted.  If no changes are necessary, no call will be placed and Patient has been advised to complete the antibiotic as prescribed today.  Patient verbalizes understanding.  Information table from ED Provider visit on 12/10/20  Symptoms reported at ED visit (Chief complaint, HPI) Chief Complaint   Patient presents with     Abdominal Pain     Headache      HPI  Katrina Ward is a 25 year old female who presents to the emergency department with headache and pelvic cramping.  The patient states that she has had a right-sided headache as well as neck pain for the past week.  She states her symptoms are worse when she turns her head.  She did report a gradual onset.  She reports some temporary relief with acetaminophen.  She reports some mild photophobia.  She denies any visual disturbance.  No diplopia.  No weakness or numbness.  Patient is also pregnant.  She thinks she may be 12 weeks pregnant.  She reports some pelvic cramping for the past couple of days as well.  She denies any vaginal bleeding or discharge.  Patient denies any alcohol or drug use.  No trauma.  No chiropractic care or manipulation.  Patient denies any fever.  She denies ill contacts.  She denies any chest pain or  dyspnea.  No cough.  Patient does report some malodorous urine but denies dysuria, urgency, and frequency.     Significant Medical hx, if applicable (i.e. CKD, diabetes) None   Allergies No Known Allergies   Weight, if applicable Wt Readings from Last 2 Encounters:   07/08/18 76.5 kg (168 lb 10.4 oz)   05/29/18 75.5 kg (166 lb 6.4 oz)      Coumadin/Warfarin [Yes /No] No   Creatinine Level (mg/dl) Creatinine   Date Value Ref Range Status   12/10/2020 0.50 (L) 0.52 - 1.04 mg/dL Final      Creatinine clearance (ml/min), if applicable Creatinine clearance cannot be calculated (Unknown ideal weight.)   Pregnant (Yes/No/NA) Yes   Breastfeeding (Yes/No/NA) ?   ED providers Impression and Plan (applicable information) 25 year old female who is 12 weeks pregnant to emergency department with right-sided headache, right-sided upper trapezius neck pain.  The patient does not have any red flag signs or symptoms.  She has a normal blood pressure here in the emergency department.  Her health labs are normal.  Her symptoms resolved with IV fluids and a single dose of Reglan.  Patient also complained of pelvic pain.  She has a soft and nontender abdomen on exam.  Again, labs were normal.  Urinalysis does not appear infected.  Pelvic ultrasound reveals a live IUP at 12 weeks 6 days with a tiny subchorionic hemorrhage.  Patient appears clinically well.  Her symptoms are resolved.  She is appropriate for outpatient follow-up.  She will follow-up with her clinic per routine.  Patient asked to return if worsening symptoms.      ED diagnosis  Nonintractable headache, unspecified chronicity pattern, unspecified headache type     Pelvic pain in pregnancy     ED provider Tommie Camara MD      RN Assessment (Patient s current Symptoms), include time called.  [Insert Left message here if message left]  2:00 Unable to leave message, phone did not ring and no voicemail box.        [RN Name]  Brooke Howard  [facility]  [facility]  Emergency Dept Lab Result RN  Ph# [phone]    Copy of Lab result   Urine Culture  Order: [id number]  Status:  Preliminary result   Visible to patient:  No (not released) Dx:  Nonintractable headache, unspecified ...  Specimen Information: Midstream Urine        (important suggestion)  Newer results are available. Click to view them now.   Component 1d ago   Specimen Description Midstream Urine    Special Requests Specimen received in preservative P    Culture Micro Abnormal   >100,000 colonies/mL   Escherichia coli   Susceptibility testing in progress   P      Culture Micro Culture in progress P    Resulting Agency [email or web address]         Specimen Collected: 12/10/20  5:20 PM Last Resulted: 12/11/20 12:34 PM

## 2020-12-11 NOTE — ED PROVIDER NOTES
ED Provider Note  [facility]      History     Chief Complaint   Patient presents with     Abdominal Pain     Headache     HPI  Katrina Ward is a 25 year old female who presents to the emergency department with headache and pelvic cramping.  The patient states that she has had a right-sided headache as well as neck pain for the past week.  She states her symptoms are worse when she turns her head.  She did report a gradual onset.  She reports some temporary relief with acetaminophen.  She reports some mild photophobia.  She denies any visual disturbance.  No diplopia.  No weakness or numbness.  Patient is also pregnant.  She thinks she may be 12 weeks pregnant.  She reports some pelvic cramping for the past couple of days as well.  She denies any vaginal bleeding or discharge.  Patient denies any alcohol or drug use.  No trauma.  No chiropractic care or manipulation.  Patient denies any fever.  She denies ill contacts.  She denies any chest pain or dyspnea.  No cough.  Patient does report some malodorous urine but denies dysuria, urgency, and frequency.    Past Medical History  Past Medical History:   Diagnosis Date     Pre-eclampsia      Preeclampsia      Scoliosis      Substance abuse (H)     in the past     Past Surgical History:   Procedure Laterality Date     NO HISTORY OF SURGERY            Prenatal Vit-Fe Fumarate-FA (PRENATAL MULTIVITAMIN PLUS IRON) 27-0.8 MG TABS per tablet      No Known Allergies  Family History  Family History   Problem Relation Age of Onset     Hypertension Mother      Hypertension Paternal Grandmother      Social History   Social History     Tobacco Use     Smoking status: Current Some Day Smoker     Packs/day: 1.00     Smokeless tobacco: Never Used   Substance Use Topics     Alcohol use: Yes     Drug use: No      Past medical history, past surgical history, medications, allergies, family history, and social history were reviewed with the patient. No  additional pertinent items.       Review of Systems   Constitutional: Negative for fever.   HENT: Negative for congestion.    Eyes: Negative for redness.   Respiratory: Negative for shortness of breath.    Cardiovascular: Negative for chest pain.   Gastrointestinal: Negative for abdominal pain.   Genitourinary: Positive for pelvic pain. Negative for difficulty urinating.   Musculoskeletal: Positive for neck pain. Negative for arthralgias and neck stiffness.   Skin: Negative for color change.   Neurological: Positive for headaches.   Psychiatric/Behavioral: Negative for confusion.   All other systems reviewed and are negative.    A complete review of systems was performed with pertinent positives and negatives noted in the HPI, and all other systems negative.    Physical Exam   BP: 126/72  Pulse: 114  Temp: 99.2  F (37.3  C)  Resp: 12  SpO2: 98 %  Physical Exam  Vitals signs and nursing note reviewed.   Constitutional:       General: She is not in acute distress.     Appearance: She is well-developed. She is not diaphoretic.   HENT:      Head: Normocephalic and atraumatic.      Mouth/Throat:      Pharynx: No oropharyngeal exudate.   Eyes:      General: No scleral icterus.     Extraocular Movements: Extraocular movements intact.      Pupils: Pupils are equal, round, and reactive to light.   Neck:      Musculoskeletal: Normal range of motion. Pain with movement and muscular tenderness present. No spinous process tenderness.      Thyroid: No thyroid mass.      Vascular: Normal carotid pulses. No carotid bruit.      Trachea: Trachea normal.     Cardiovascular:      Rate and Rhythm: Normal rate and regular rhythm.      Heart sounds: Normal heart sounds.   Pulmonary:      Effort: No respiratory distress.      Breath sounds: Normal breath sounds.   Abdominal:      General: Bowel sounds are normal.      Palpations: Abdomen is soft.      Tenderness: There is no abdominal tenderness.   Genitourinary:     Vagina: Normal.       Cervix: Normal.      Uterus: Normal.       Adnexa: Right adnexa normal and left adnexa normal.   Musculoskeletal:      Cervical back: She exhibits tenderness (Right upper trapezius) and pain. She exhibits normal range of motion, no bony tenderness and no swelling.        Back:    Lymphadenopathy:      Cervical: No cervical adenopathy.   Skin:     General: Skin is warm.      Findings: No rash.   Neurological:      General: No focal deficit present.      Mental Status: She is alert and oriented to person, place, and time.      Cranial Nerves: No cranial nerve deficit.      Motor: No weakness.      Coordination: Coordination is intact. Coordination normal.      Deep Tendon Reflexes: Babinski sign absent on the right side. Babinski sign absent on the left side.         ED Course      Procedures           Results for orders placed or performed during the hospital encounter of 12/10/20   US OB < 14 Weeks Single     Status: None    Narrative    EXAM: ULTRASOUND OBSTETRIC FIRST TRIMESTER  LOCATION: [facility]  DATE/TIME: 12/10/2020 9:41 AM    INDICATION: Pregnant. Mid pelvic cramping for 1 day.  COMPARISON: None.    TECHNIQUE: Transabdominal scans were performed.    FINDINGS:    UTERUS: A gestational sac is present in the endometrial cavity. A fetus is present in the gestational sac. A yolk sac is not visualized.  CRL: 6.4 cm, corresponding to an estimated gestational age of 12 weeks 6 days.  EDC: 06/18/2021.  EMBRYONIC HEART RATE: 163 bpm.  AMNIOTIC FLUID: Unremarkable.  PLACENTA: Tiny subchorionic hemorrhage.    RIGHT OVARY: Unremarkable, measuring 3.2 x 2.4 x 1.7 cm.  LEFT OVARY: Unremarkable, measuring 2.4 x 1.6 x 1.1 cm.    OTHER: No adnexal masses. No free fluid in the pelvis.      Impression    IMPRESSION:   1. Single live intrauterine pregnancy at 12 weeks 6 days estimated gestational age based upon crown-rump length measurement on this study. The estimated date of delivery is 06/18/2021.  2. Tiny  subchorionic hemorrhage.    HCG qualitative urine (UPT)     Status: Abnormal   Result Value Ref Range    HCG Qual Urine Positive (A) NEG^Negative   UA with Microscopic     Status: Abnormal   Result Value Ref Range    Color Urine Yellow     Appearance Urine Slightly Cloudy     Glucose Urine Negative NEG^Negative mg/dL    Bilirubin Urine Negative NEG^Negative    Ketones Urine Negative NEG^Negative mg/dL    Specific Gravity Urine 1.023 1.003 - 1.035    Blood Urine Negative NEG^Negative    pH Urine 6.0 5.0 - 7.0 pH    Protein Albumin Urine 10 (A) NEG^Negative mg/dL    Urobilinogen mg/dL Normal 0.0 - 2.0 mg/dL    Nitrite Urine Positive (A) NEG^Negative    Leukocyte Esterase Urine Negative NEG^Negative    Source Midstream Urine     WBC Urine 3 0 - 5 /HPF    RBC Urine 1 0 - 2 /HPF    Bacteria Urine Few (A) NEG^Negative /HPF    Squamous Epithelial /HPF Urine 5 (H) 0 - 1 /HPF    Mucous Urine Present (A) NEG^Negative /LPF   CBC with platelets differential     Status: None   Result Value Ref Range    WBC 9.1 4.0 - 11.0 10e9/L    RBC Count 4.32 3.8 - 5.2 10e12/L    Hemoglobin 13.2 11.7 - 15.7 g/dL    Hematocrit 39.0 35.0 - 47.0 %    MCV 90 78 - 100 fl    MCH 30.6 26.5 - 33.0 pg    MCHC 33.8 31.5 - 36.5 g/dL    RDW 13.3 10.0 - 15.0 %    Platelet Count 252 150 - 450 10e9/L    Diff Method Automated Method     % Neutrophils 66.8 %    % Lymphocytes 25.2 %    % Monocytes 6.2 %    % Eosinophils 1.2 %    % Basophils 0.3 %    % Immature Granulocytes 0.3 %    Nucleated RBCs 0 0 /100    Absolute Neutrophil 6.1 1.6 - 8.3 10e9/L    Absolute Lymphocytes 2.3 0.8 - 5.3 10e9/L    Absolute Monocytes 0.6 0.0 - 1.3 10e9/L    Absolute Eosinophils 0.1 0.0 - 0.7 10e9/L    Absolute Basophils 0.0 0.0 - 0.2 10e9/L    Abs Immature Granulocytes 0.0 0 - 0.4 10e9/L    Absolute Nucleated RBC 0.0    Comprehensive metabolic panel     Status: Abnormal   Result Value Ref Range    Sodium 138 133 - 144 mmol/L    Potassium 3.7 3.4 - 5.3 mmol/L    Chloride 106 94 -  109 mmol/L    Carbon Dioxide 26 20 - 32 mmol/L    Anion Gap 6 3 - 14 mmol/L    Glucose 82 70 - 99 mg/dL    Urea Nitrogen 10 7 - 30 mg/dL    Creatinine 0.50 (L) 0.52 - 1.04 mg/dL    GFR Estimate >90 >60 mL/min/[1.73_m2]    GFR Estimate If Black >90 >60 mL/min/[1.73_m2]    Calcium 8.0 (L) 8.5 - 10.1 mg/dL    Bilirubin Total 0.3 0.2 - 1.3 mg/dL    Albumin 3.2 (L) 3.4 - 5.0 g/dL    Protein Total 6.6 (L) 6.8 - 8.8 g/dL    Alkaline Phosphatase 41 40 - 150 U/L    ALT 16 0 - 50 U/L    AST 8 0 - 45 U/L   HCG quantitative pregnancy     Status: Abnormal   Result Value Ref Range    HCG Quantitative Serum 9,700 (H) 0 - 5 IU/L   Wet prep     Status: None    Specimen: Cervix   Result Value Ref Range    Specimen Description Cervix     Wet Prep No motile Trichomonas seen     Wet Prep No yeast seen     Wet Prep Moderate  PMNs seen       Wet Prep No clue cells seen      Medications   0.9% sodium chloride BOLUS (1,000 mLs Intravenous New Bag 12/10/20 2012)   metoclopramide (REGLAN) injection 5 mg (5 mg Intravenous Given 12/10/20 2012)      10:30 PM Feeling better.  Headache resolved.     Assessments & Plan (with Medical Decision Making)   25 year old female who is 12 weeks pregnant to emergency department with right-sided headache, right-sided upper trapezius neck pain.  The patient does not have any red flag signs or symptoms.  She has a normal blood pressure here in the emergency department.  Her health labs are normal.  Her symptoms resolved with IV fluids and a single dose of Reglan.  Patient also complained of pelvic pain.  She has a soft and nontender abdomen on exam.  Again, labs were normal.  Urinalysis does not appear infected.  Pelvic ultrasound reveals a live IUP at 12 weeks 6 days with a tiny subchorionic hemorrhage.  Patient appears clinically well.  Her symptoms are resolved.  She is appropriate for outpatient follow-up.  She will follow-up with her clinic per routine.  Patient asked to return if worsening symptoms.    I  have reviewed the nursing notes. I have reviewed the findings, diagnosis, plan and need for follow up with the patient.    New Prescriptions    No medications on file       Final diagnoses:   Nonintractable headache, unspecified chronicity pattern, unspecified headache type   Pelvic pain in pregnancy     Chart documentation was completed with Dragon voice-recognition software. Even though reviewed, this chart may still contain some grammatical, spelling, and word errors.     --  Tommie Camara Md    [facility] EMERGENCY DEPARTMENT  12/10/2020     Tommie Camara MD  12/10/20 [id number]

## 2020-12-11 NOTE — ED NOTES
Pt complains of headache from top of head to occipital area from about one week and neck pain for two days;  Pt took tylenol earlier with little relief. Pt also has pelvic pain.  Pt states she can feel something is wrong with her cervix.  Pt has hx of preeclampsia and states these symptoms are very similar to the ones she experience with that pregnancy.  Preeclampsia wasn't identified until 34 weeks which resulting in stat induction.

## 2020-12-12 RX ORDER — CEPHALEXIN 500 MG/1
500 CAPSULE ORAL 2 TIMES DAILY
Qty: 10 CAPSULE | Refills: 0 | Status: SHIPPED | OUTPATIENT
Start: 2020-12-12 | End: 2020-12-17

## 2020-12-12 NOTE — TELEPHONE ENCOUNTER
[facility] Emergency Department Lab result notification [Adult-Female]     [place] ED lab result protocol used  Urine culture     Reason for call  Notify of lab results, assess symptoms,  review ED providers recommendations/discharge instructions (if necessary) and advise per ED lab result f/u protocol     Lab Result (including Rx patient on, if applicable)  Final urine culture on 12/12/2020 shows the presence of bacteria(s): >100,000 colonies/mL Escherichia coli   [place] Emergency Dept/Urgent Care discharge antibiotic: None  As per  ED lab result protocol, treat per Urine culture protocol.    RN Assessment (Patient s current Symptoms), include time called.  [Insert Left message here if message left]  2:24PM: Patient returned call. States that she still has a headache. Has a follow up scheduled with her OB.     RN Recommendations/Instructions per [place] ED lab result protocol  Patient notified of lab result and treatment recommendations.  Rx for Cephalexin (Keflex) 500 mg capsule, 1 capsule (500 mg) by mouth 2 times daily for 5 days sent to [Pharmacy - [facility] in [place] on [facility]].  RN reviewed information about UTI's.  Patient Education on preventing future UTI's.  1. Practice good personal hygiene. Wipe yourself from front to back after using the toilet. This helps keep bacteria from getting into the urethra. Keep the genital area clean and dry.  2. Drink plenty of fluids, such as water, juice, or other caffeine-free drinks.    3. Empty your bladder. Always empty your bladder when you feel the urge to urinate. And always urinate before going to sleep. Urine that stays in your bladder can lead to infection. Try to urinate before and after sex as well.  4. Follow up with your health care provider as directed. He or she may test to make sure the infection has cleared. If necessary, additional treatment may be started.    Advised to follow up with the PCP as directed by the ED provider.  The  patient is comfortable with the information given and has no further questions.      Please Contact your PCP clinic or return to the Emergency department if your:    Symptoms worsen or other concerning symptom's.    PCP follow-up Questions asked: YES       [RN Name]  Monica Pickett RN  [facility] RN  Lung Nodule and ED Lab Result RN  Epic pool (ED late result f/u RN): [id number]  [facility] INCIDENTAL RADIOLOGY F/U NURSES: P [id number]  # [phone]

## 2020-12-12 NOTE — TELEPHONE ENCOUNTER
[facility] Emergency Department Lab result notification [Adult-Female]    [place] ED lab result protocol used  Urine culture    Reason for call  Notify of lab results, assess symptoms,  review ED providers recommendations/discharge instructions (if necessary) and advise per ED lab result f/u protocol    Lab Result (including Rx patient on, if applicable)  Final urine culture on 12/12/2020 shows the presence of bacteria(s): >100,000 colonies/mL Escherichia coli   [place] Emergency Dept/Urgent Care discharge antibiotic: None  As per  ED lab result protocol, treat per Urine culture protocol.  Information table from ED Provider visit on 12/10/2020  Symptoms reported at ED visit (Chief complaint, HPI) See below   Significant Medical hx, if applicable (i.e. CKD, diabetes) None   Allergies No Known Allergies   Weight, if applicable Wt Readings from Last 2 Encounters:   07/08/18 76.5 kg (168 lb 10.4 oz)   05/29/18 75.5 kg (166 lb 6.4 oz)      Coumadin/Warfarin [Yes /No] No   Creatinine Level (mg/dl) Creatinine   Date Value Ref Range Status   12/10/2020 0.50 (L) 0.52 - 1.04 mg/dL Final      Creatinine clearance (ml/min), if applicable Creatinine clearance cannot be calculated (Unknown ideal weight.)   Pregnant (Yes/No/NA) Yes   Breastfeeding (Yes/No/NA) na   ED providers Impression and Plan (applicable information) See below   ED diagnosis  Nonintractable headache, unspecified chronicity pattern, unspecified headache type      Pelvic pain in pregnancy   ED provider Tommie Camara MD RN Assessment (Patient s current Symptoms), include time called.  [Insert Left message here if message left]  1:03PM: Left voicemail message requesting a call back to [facility] ED Lab Result RN at [phone].  RN is available every day between 10 a.m. and 6:30 p.m..      [RN Name]  Monica Pickett RN  [facility] RN  Lung Nodule and ED Lab Result RN  Epic pool (ED late result f/u RN): [id number]  [facility] INCIDENTAL  RADIOLOGY F/U NURSES: P [id number]  # [phone]    Copy of Lab result   Urine Culture  Order: [id number]  Status:  Final result   Visible to patient:  Yes (MyChart) Dx:  Nonintractable headache, unspecified ...  Specimen Information: Midstream Urine        (important suggestion)  Newer results are available. Click to view them now.   Component 2d ago   Specimen Description Midstream Urine    Special Requests Specimen received in preservative    Culture Micro Abnormal   >100,000 colonies/mL   Escherichia coli     Resulting Agency [facility]   Susceptibility     Escherichia coli     THANH     AMPICILLIN >=32 ug/mL Resistant     AMPICILLIN/SULBACTAM 8 ug/mL Sensitive     CEFAZOLIN <=4 ug/mL Sensitive1     CEFEPIME <=1 ug/mL Sensitive     CEFOXITIN <=4 ug/mL Sensitive     CEFTAZIDIME <=1 ug/mL Sensitive     CEFTRIAXONE <=1 ug/mL Sensitive     CIPROFLOXACIN <=0.25 ug/mL Sensitive     GENTAMICIN <=1 ug/mL Sensitive     LEVOFLOXACIN <=0.12 ug/mL Sensitive     NITROFURANTOIN <=16 ug/mL Sensitive     Piperacillin/Tazo <=4 ug/mL Sensitive     TOBRAMYCIN <=1 ug/mL Sensitive     Trimethoprim/Sulfa >=16/304 ug/mL Resistant           1 Cefazolin THANH breakpoints are for the treatment of uncomplicated urinary tract    infections.  For the treatment of systemic infections, please contact the   laboratory for additional testing.            Specimen Collected: 12/10/20  5:20 PM Last Resulted: 12/11/20  9:27 PM

## 2020-12-14 ENCOUNTER — HEALTH MAINTENANCE LETTER (OUTPATIENT)
Age: 25
End: 2020-12-14

## 2021-04-18 ENCOUNTER — HEALTH MAINTENANCE LETTER (OUTPATIENT)
Age: 26
End: 2021-04-18

## 2021-10-02 ENCOUNTER — HEALTH MAINTENANCE LETTER (OUTPATIENT)
Age: 26
End: 2021-10-02

## 2022-05-14 ENCOUNTER — HEALTH MAINTENANCE LETTER (OUTPATIENT)
Age: 27
End: 2022-05-14

## 2022-09-03 ENCOUNTER — HEALTH MAINTENANCE LETTER (OUTPATIENT)
Age: 27
End: 2022-09-03

## 2023-03-26 ENCOUNTER — HOSPITAL ENCOUNTER (EMERGENCY)
Facility: CLINIC | Age: 28
Discharge: HOME | End: 2023-03-26
Attending: EMERGENCY MEDICINE | Admitting: EMERGENCY MEDICINE
Payer: COMMERCIAL

## 2023-03-26 VITALS
BODY MASS INDEX: 22.08 KG/M2 | SYSTOLIC BLOOD PRESSURE: 128 MMHG | RESPIRATION RATE: 16 BRPM | OXYGEN SATURATION: 100 % | TEMPERATURE: 97.6 F | DIASTOLIC BLOOD PRESSURE: 81 MMHG | HEIGHT: 62 IN | HEART RATE: 86 BPM | WEIGHT: 120 LBS

## 2023-03-26 DIAGNOSIS — K08.89 PAIN, DENTAL: ICD-10-CM

## 2023-03-26 PROCEDURE — 99283 EMERGENCY DEPT VISIT LOW MDM: CPT

## 2023-03-26 PROCEDURE — 250N000013 HC RX MED GY IP 250 OP 250 PS 637: Performed by: EMERGENCY MEDICINE

## 2023-03-26 RX ORDER — PENICILLIN V POTASSIUM 500 MG/1
500 TABLET, FILM COATED ORAL 4 TIMES DAILY
Qty: 40 TABLET | Refills: 0 | Status: SHIPPED | OUTPATIENT
Start: 2023-03-26 | End: 2023-04-05

## 2023-03-26 RX ORDER — IBUPROFEN 600 MG/1
600 TABLET, FILM COATED ORAL EVERY 6 HOURS PRN
Qty: 20 TABLET | Refills: 0 | Status: SHIPPED | OUTPATIENT
Start: 2023-03-26

## 2023-03-26 RX ORDER — IBUPROFEN 600 MG/1
600 TABLET, FILM COATED ORAL ONCE
Status: DISCONTINUED | OUTPATIENT
Start: 2023-03-26 | End: 2023-03-26

## 2023-03-26 RX ORDER — IBUPROFEN 600 MG/1
600 TABLET, FILM COATED ORAL ONCE
Status: COMPLETED | OUTPATIENT
Start: 2023-03-26 | End: 2023-03-26

## 2023-03-26 RX ADMIN — IBUPROFEN 600 MG: 600 TABLET ORAL at 11:41

## 2023-03-26 ASSESSMENT — ENCOUNTER SYMPTOMS
FEVER: 0
NECK PAIN: 0
CHILLS: 0
NUMBNESS: 0
NAUSEA: 0
HEADACHES: 1
VOMITING: 0
COUGH: 0
NECK STIFFNESS: 0

## 2023-03-26 NOTE — ED PROVIDER NOTES
"  History     Chief Complaint:  Dental Pain       HPI   Katrina Ward is a 27 year old female who presents with left lower molar tooth pain which radiates up the left side of her head.  She states she has had pain to the left lower molar tooth for the past week and has not seen a dentist yet and now today the pain is worse and radiates up the left jaw to the left ear and the left side of her head.  She denies any vomiting, nausea, numbness, weakness, vision changes, fevers, chills, cough, cold symptoms.  No recent trauma.    Independent Historian:   None - Patient Only      ROS:  Review of Systems   Constitutional: Negative for chills and fever.   Respiratory: Negative for cough.    Gastrointestinal: Negative for nausea and vomiting.   Musculoskeletal: Negative for neck pain and neck stiffness.   Neurological: Positive for headaches. Negative for numbness.   All other systems reviewed and are negative.      Allergies:  No Known Allergies     Medications:    Ibuprofen as needed    Past Medical History:    Past Medical History:   Diagnosis Date     Pre-eclampsia      Preeclampsia      Scoliosis      Substance abuse (H)        Past Surgical History:    Past Surgical History:   Procedure Laterality Date     NO HISTORY OF SURGERY          Family History:    family history includes Hypertension in her mother and paternal grandmother.    Social History:   reports that she has been smoking. She has been smoking an average of 1 pack per day. She has never used smokeless tobacco. She reports current alcohol use. She reports that she does not use drugs.  PCP: [facility], [place]     Physical Exam     Patient Vitals for the past 24 hrs:   BP Temp Temp src Pulse Resp SpO2 Height Weight   03/26/23 1037 128/81 97.6  F (36.4  C) Temporal 86 16 100 % 1.575 m (5' 2\") 54.4 kg (120 lb)        Physical Exam  Nursing note and vitals reviewed.  Constitutional:  Appears well-developed and well-nourished.   HENT:   Head:    TM's " clear.  No visible facial swelling or discoloration.  No mastoid tenderness or swelling.  Mouth/Throat:   Oropharynx is clear and moist. No oropharyngeal exudate. Tenderness to tooth #19, which has a dental cavity in the posterior aspect of the tooth.  No gingival swelling or drainage.  Eyes:    Pupils are equal, round, and reactive to light.   Neck:    Normal range of motion. Neck supple.      No tracheal deviation present. No thyromegaly present.   Cardiovascular:  Normal rate, regular rhythm, no murmur   Pulmonary/Chest: Breath sounds are clear and equal without wheezes or crackles.  Abdominal:   Soft. Bowel sounds are normal. Exhibits no distension and      no mass. There is no tenderness.      There is no rebound and no guarding.   Musculoskeletal:  Exhibits no edema.   Lymphadenopathy:  No cervical adenopathy.   Neurological:   Alert and oriented to person, place, and time.   Skin:    Skin is warm and dry. No rash noted. No pallor.       Emergency Department Course     Emergency Department Course & Assessments:       Interventions:  Medications   ibuprofen (ADVIL/MOTRIN) tablet 600 mg (600 mg Oral $Given 3/26/23 1141)        Social Determinants of Health affecting care:   Healthcare Access/Compliance    Disposition:  The patient was discharged to home.     Impression & Plan        Medical Decision Making:  I feel that this patient's symptoms are due to a periapical dental infection of the lower molar tooth causing pain radiating up the left side of her head.  She does not have any sign of ear infection.  No sign of serious intracranial problem such as brain abscess or intracranial bleeding or aneurysm or central venous thrombus.  No sign of any deeper space infection or Erna's angina.  She was prescribed penicillin and prescription strength ibuprofen and given a dental referral sheet and told to call to schedule follow-up with a dentist this week.  She was instructed signs and symptoms to return for  including any worsening pain, or any fever, facial swelling or trouble swallowing.    Diagnosis:    ICD-10-CM    1. Pain, dental  K08.89            Discharge Medications:  New Prescriptions    IBUPROFEN (ADVIL/MOTRIN) 600 MG TABLET    Take 1 tablet (600 mg) by mouth every 6 hours as needed for moderate pain (4-6) (Take with food)    PENICILLIN V (VEETID) 500 MG TABLET    Take 1 tablet (500 mg) by mouth 4 times daily for 10 days     3/26/2023   Gretchen Orellana MD [place], Cheri Lee, MD  03/26/23 1824

## 2023-03-26 NOTE — ED TRIAGE NOTES
Reports L lower dental pain for past week and now having head and eye pain.     Triage Assessment     Row Name 03/26/23 1044       Triage Assessment (Adult)    Airway WDL WDL       Respiratory WDL    Respiratory WDL WDL       Skin Circulation/Temperature WDL    Skin Circulation/Temperature WDL WDL       Cardiac WDL    Cardiac WDL WDL       Peripheral/Neurovascular WDL    Peripheral Neurovascular WDL WDL       Cognitive/Neuro/Behavioral WDL    Cognitive/Neuro/Behavioral WDL WDL              
The patient is a 35y Male complaining of wound check.

## 2023-03-26 NOTE — ED NOTES
Attempted to call patient to come back to ED to give dental community referral list, no answer for her cell or her significant others' cell.

## 2023-06-03 ENCOUNTER — HEALTH MAINTENANCE LETTER (OUTPATIENT)
Age: 28
End: 2023-06-03

## 2024-07-06 ENCOUNTER — HEALTH MAINTENANCE LETTER (OUTPATIENT)
Age: 29
End: 2024-07-06

## 2025-03-04 ENCOUNTER — HOSPITAL ENCOUNTER (EMERGENCY)
Facility: HOSPITAL | Age: 30
Discharge: HOME | End: 2025-03-06
Attending: STUDENT IN AN ORGANIZED HEALTH CARE EDUCATION/TRAINING PROGRAM
Payer: COMMERCIAL

## 2025-03-04 DIAGNOSIS — F23 ACUTE PSYCHOSIS (H): ICD-10-CM

## 2025-03-04 DIAGNOSIS — R45.1 AGITATION: ICD-10-CM

## 2025-03-04 LAB
ALBUMIN SERPL BCG-MCNC: 4 G/DL (ref 3.5–5.2)
ALP SERPL-CCNC: 53 U/L (ref 40–150)
ALT SERPL W P-5'-P-CCNC: 18 U/L (ref 0–50)
ANION GAP SERPL CALCULATED.3IONS-SCNC: 10 MMOL/L (ref 7–15)
APAP SERPL-MCNC: <5 UG/ML (ref 10–30)
AST SERPL W P-5'-P-CCNC: 20 U/L (ref 0–45)
BASOPHILS # BLD AUTO: 0 10E3/UL (ref 0–0.2)
BASOPHILS NFR BLD AUTO: 0 %
BILIRUB DIRECT SERPL-MCNC: 0.13 MG/DL (ref 0–0.3)
BILIRUB SERPL-MCNC: 0.4 MG/DL
BUN SERPL-MCNC: 5.7 MG/DL (ref 6–20)
CALCIUM SERPL-MCNC: 8.8 MG/DL (ref 8.8–10.4)
CHLORIDE SERPL-SCNC: 105 MMOL/L (ref 98–107)
CREAT SERPL-MCNC: 0.7 MG/DL (ref 0.51–0.95)
EGFRCR SERPLBLD CKD-EPI 2021: >90 ML/MIN/1.73M2
EOSINOPHIL # BLD AUTO: 0 10E3/UL (ref 0–0.7)
EOSINOPHIL NFR BLD AUTO: 1 %
ERYTHROCYTE [DISTWIDTH] IN BLOOD BY AUTOMATED COUNT: 12.1 % (ref 10–15)
FLUAV RNA SPEC QL NAA+PROBE: NEGATIVE
FLUBV RNA RESP QL NAA+PROBE: NEGATIVE
GLUCOSE SERPL-MCNC: 91 MG/DL (ref 70–99)
HCG SERPL QL: NEGATIVE
HCO3 SERPL-SCNC: 25 MMOL/L (ref 22–29)
HCT VFR BLD AUTO: 37.4 % (ref 35–47)
HGB BLD-MCNC: 12.6 G/DL (ref 11.7–15.7)
IMM GRANULOCYTES # BLD: 0 10E3/UL
IMM GRANULOCYTES NFR BLD: 0 %
LYMPHOCYTES # BLD AUTO: 2.2 10E3/UL (ref 0.8–5.3)
LYMPHOCYTES NFR BLD AUTO: 39 %
MCH RBC QN AUTO: 29.8 PG (ref 26.5–33)
MCHC RBC AUTO-ENTMCNC: 33.7 G/DL (ref 31.5–36.5)
MCV RBC AUTO: 88 FL (ref 78–100)
MONOCYTES # BLD AUTO: 0.5 10E3/UL (ref 0–1.3)
MONOCYTES NFR BLD AUTO: 9 %
NEUTROPHILS # BLD AUTO: 2.8 10E3/UL (ref 1.6–8.3)
NEUTROPHILS NFR BLD AUTO: 51 %
NRBC # BLD AUTO: 0 10E3/UL
NRBC BLD AUTO-RTO: 0 /100
PLATELET # BLD AUTO: 272 10E3/UL (ref 150–450)
POTASSIUM SERPL-SCNC: 3.1 MMOL/L (ref 3.4–5.3)
PROT SERPL-MCNC: 6.6 G/DL (ref 6.4–8.3)
RBC # BLD AUTO: 4.23 10E6/UL (ref 3.8–5.2)
RSV RNA SPEC NAA+PROBE: NEGATIVE
SALICYLATES SERPL-MCNC: <0.3 MG/DL
SARS-COV-2 RNA RESP QL NAA+PROBE: NEGATIVE
SODIUM SERPL-SCNC: 140 MMOL/L (ref 135–145)
WBC # BLD AUTO: 5.5 10E3/UL (ref 4–11)

## 2025-03-04 PROCEDURE — 80048 BASIC METABOLIC PNL TOTAL CA: CPT | Performed by: STUDENT IN AN ORGANIZED HEALTH CARE EDUCATION/TRAINING PROGRAM

## 2025-03-04 PROCEDURE — 85025 COMPLETE CBC W/AUTO DIFF WBC: CPT | Performed by: STUDENT IN AN ORGANIZED HEALTH CARE EDUCATION/TRAINING PROGRAM

## 2025-03-04 PROCEDURE — 87637 SARSCOV2&INF A&B&RSV AMP PRB: CPT | Performed by: STUDENT IN AN ORGANIZED HEALTH CARE EDUCATION/TRAINING PROGRAM

## 2025-03-04 PROCEDURE — 36415 COLL VENOUS BLD VENIPUNCTURE: CPT | Performed by: STUDENT IN AN ORGANIZED HEALTH CARE EDUCATION/TRAINING PROGRAM

## 2025-03-04 PROCEDURE — 99285 EMERGENCY DEPT VISIT HI MDM: CPT | Mod: 25

## 2025-03-04 PROCEDURE — 84155 ASSAY OF PROTEIN SERUM: CPT | Performed by: STUDENT IN AN ORGANIZED HEALTH CARE EDUCATION/TRAINING PROGRAM

## 2025-03-04 PROCEDURE — 80179 DRUG ASSAY SALICYLATE: CPT | Performed by: STUDENT IN AN ORGANIZED HEALTH CARE EDUCATION/TRAINING PROGRAM

## 2025-03-04 PROCEDURE — 250N000011 HC RX IP 250 OP 636: Performed by: STUDENT IN AN ORGANIZED HEALTH CARE EDUCATION/TRAINING PROGRAM

## 2025-03-04 PROCEDURE — 80143 DRUG ASSAY ACETAMINOPHEN: CPT | Performed by: STUDENT IN AN ORGANIZED HEALTH CARE EDUCATION/TRAINING PROGRAM

## 2025-03-04 PROCEDURE — 96372 THER/PROPH/DIAG INJ SC/IM: CPT | Performed by: STUDENT IN AN ORGANIZED HEALTH CARE EDUCATION/TRAINING PROGRAM

## 2025-03-04 PROCEDURE — 82040 ASSAY OF SERUM ALBUMIN: CPT | Performed by: STUDENT IN AN ORGANIZED HEALTH CARE EDUCATION/TRAINING PROGRAM

## 2025-03-04 PROCEDURE — 84703 CHORIONIC GONADOTROPIN ASSAY: CPT | Performed by: STUDENT IN AN ORGANIZED HEALTH CARE EDUCATION/TRAINING PROGRAM

## 2025-03-04 RX ORDER — DIPHENHYDRAMINE HCL 50 MG
50 CAPSULE ORAL EVERY 8 HOURS PRN
Status: DISCONTINUED | OUTPATIENT
Start: 2025-03-04 | End: 2025-03-06 | Stop reason: HOSPADM

## 2025-03-04 RX ORDER — HALOPERIDOL 5 MG/1
5 TABLET ORAL EVERY 8 HOURS PRN
Status: DISCONTINUED | OUTPATIENT
Start: 2025-03-04 | End: 2025-03-06 | Stop reason: HOSPADM

## 2025-03-04 RX ORDER — DIPHENHYDRAMINE HYDROCHLORIDE 50 MG/ML
50 INJECTION, SOLUTION INTRAMUSCULAR; INTRAVENOUS EVERY 8 HOURS PRN
Status: DISCONTINUED | OUTPATIENT
Start: 2025-03-04 | End: 2025-03-06 | Stop reason: HOSPADM

## 2025-03-04 RX ORDER — LORAZEPAM 2 MG/ML
2 INJECTION INTRAMUSCULAR EVERY 8 HOURS PRN
Status: DISCONTINUED | OUTPATIENT
Start: 2025-03-04 | End: 2025-03-06 | Stop reason: HOSPADM

## 2025-03-04 RX ORDER — LORAZEPAM 0.5 MG/1
2 TABLET ORAL EVERY 8 HOURS PRN
Status: DISCONTINUED | OUTPATIENT
Start: 2025-03-04 | End: 2025-03-06 | Stop reason: HOSPADM

## 2025-03-04 RX ORDER — HALOPERIDOL 5 MG/ML
5 INJECTION INTRAMUSCULAR EVERY 8 HOURS PRN
Status: DISCONTINUED | OUTPATIENT
Start: 2025-03-04 | End: 2025-03-06 | Stop reason: HOSPADM

## 2025-03-04 RX ADMIN — DIPHENHYDRAMINE HYDROCHLORIDE 50 MG: 50 INJECTION, SOLUTION INTRAMUSCULAR; INTRAVENOUS at 16:57

## 2025-03-04 RX ADMIN — LORAZEPAM 2 MG: 2 INJECTION INTRAMUSCULAR; INTRAVENOUS at 16:55

## 2025-03-04 RX ADMIN — HALOPERIDOL LACTATE 5 MG: 5 INJECTION, SOLUTION INTRAMUSCULAR at 16:56

## 2025-03-04 ASSESSMENT — ACTIVITIES OF DAILY LIVING (ADL)
ADLS_ACUITY_SCORE: 41
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
## 2025-03-04 NOTE — ED PROVIDER NOTES
"EMERGENCY DEPARTMENT ENCOUNTER       ED Course & Medical Decision Making     Final Impression  29 year old female brought in by male friend for evaluation of manic/abnormal behavior.  Patient's friend/boyfriend (Yojana Escalera, [phone]) brought patient into the ED for an evaluation for manic behavior, before patient could even get registration desk she began shouting and screaming at staff, code BOBBY called from the waiting room before patient was even registered. I went out to waiting room due to code BOBBY being called to evaluate patient, found patient lying on the ground, yelling and screaming, hitting various nurses and security guards around her were \"fired\", states that it is her  that needs to be evaluated, states that he has been shot (he is actually very well-appearing, calm, cooperative, no evidence of injury or emergency condition).  Friend relayed to me that starting yesterday she began acting strangely, he is describing manic and psychotic behavior.  He is unsure about drug use, the think she may have some history of psychosis in the past though he is unsure what her diagnosis is or when that was.  Patient states that he has known patient for about a year, she currently lives with him, though he is not able to tell me much about her history, he states that she has kids, though he cannot tell me, decayed she has or how old they are.  Specifically, he reports that she was acting strangely around the house yesterday thinking that he was her baby and was walking around crating laying in invisible baby in her arms.  Patient unable to be redirected, lying on the waiting room floor, screaming at various staff, appears floridly psychotic.  Unable to verbally redirect patient to yesterday for a brief medical screening exam, she refused to answer any questions whatsoever, extremely incoherent thoughts and pressured speech, consistent with psychosis, organic versus drug-induced.  Patient eventually manually " moved to a bed and ED 41, B-52 administered, 4 point restraints applied, and patient brought back to ED bed 7.    At time of B-52 being administered, patient was screaming that she was allergic to Ativan.  Suspect this is related to her psychosis and not a true allergy.  Ativan was given and patient was observed closely for allergic reaction.  No such allergic reactions were observed to any of the Benadryl, Haldol, or Ativan given intramuscularly.    Patient still asleep at sign out, will need continued observation until awake and then re-evaluation for other medical or psychiatric complaints.     Patient signed out to Dr Ruiz at shift change    Medical Decision Making  Supplemental history from: Boyfriend  External Record(s) reviewed as documented below;  8/23/2023, [facility] emergency ED note, seen for methamphetamine use.  Chart documentation includes differential considered and any EKGs or imaging independently interpreted by provider, where specified.  Care impacted by chronic illness: Drug use, tobacco use, generalized anxiety disorder  Disposition considerations: Admission considered. Patient was signed out to the oncoming physician, disposition pending.    Not Applicable    Medications   haloperidol lactate (HALDOL) injection 5 mg (5 mg Intramuscular $Given 3/4/25 5852)     And   LORazepam (ATIVAN) injection 2 mg (2 mg Intramuscular $Given 3/4/25 8168)     And   diphenhydrAMINE (BENADRYL) injection 50 mg (50 mg Intramuscular $Given 3/4/25 2277)   haloperidol (HALDOL) tablet 5 mg (has no administration in time range)     And   LORazepam (ATIVAN) tablet 2 mg (has no administration in time range)     And   diphenhydrAMINE (BENADRYL) capsule 50 mg (has no administration in time range)       New Prescriptions    No medications on file     Modified Medications    No medications on file       Final Impression     1. Acute psychosis (H)    2. Agitation        Critical Care     Performed by: Kenn Pedro,  MD  Authorized by: Kenn Pedro MD                   Total critical care time: 30 minutes  Critical care was necessary to treat or prevent imminent or life-threatening deterioration of the following conditions: Acute psychosis requiring physical and chemical restraints of Ativan, Haldol, and Benadryl  Critical care was time spent personally by me on the following activities: development of treatment plan with patient or surrogate, discussions with consultants, examination of patient, evaluation of patient's response to treatment, obtaining history from patient or surrogate, ordering and performing treatments and interventions, ordering and review of laboratory studies, ordering and review of radiographic studies, re-evaluation of patient's condition and monitoring for potential decompensation.  Critical care time was exclusive of separately billable procedures and treating other patients.    Chief Complaint     Chief Complaint   Patient presents with    Manic Behavior     HPI     Katrina Ward is a 29 year old female who presents for evaluation of psychosis.    Physical Exam     BP (!) 137/95 (BP Location: Left arm)   Pulse 98   Temp 98  F (36.7  C) (Oral)   Resp 18   SpO2 100%   Constitutional: Awake, alert, lying on waiting room floor screaming at staff  Head: Normocephalic, atraumatic.  ENT: Mucous membranes moist.  Eyes: Conjunctiva normal.  Respiratory: Respirations even, unlabored, in no acute respiratory distress.  Cardiovascular: Tachycardic. Good peripheral perfusion.  GI: Abdomen soft, nondistended  Musculoskeletal: Moves all 4 extremities equally.  Good strength, vigorous in all 4 extremities.  Integument: Warm, dry.  Neurologic: Alert, will not answer any orientation questions.  Psychiatric: Appears floridly psychotic, unclear if substance-induced versus organic psychiatric illness.  Combative with staff, screaming, kicking, attempting to leave ED, unable to be redirected verbally    Labs &  Imaging     Results for orders placed or performed during the hospital encounter of 03/04/25   Basic metabolic panel   Result Value Ref Range    Sodium 140 135 - 145 mmol/L    Potassium 3.1 (L) 3.4 - 5.3 mmol/L    Chloride 105 98 - 107 mmol/L    Carbon Dioxide (CO2) 25 22 - 29 mmol/L    Anion Gap 10 7 - 15 mmol/L    Urea Nitrogen 5.7 (L) 6.0 - 20.0 mg/dL    Creatinine 0.70 0.51 - 0.95 mg/dL    GFR Estimate >90 >60 mL/min/1.73m2    Calcium 8.8 8.8 - 10.4 mg/dL    Glucose 91 70 - 99 mg/dL   HCG QUALitative pregnancy (blood)   Result Value Ref Range    hCG Serum Qualitative Negative Negative   Hepatic function panel   Result Value Ref Range    Protein Total 6.6 6.4 - 8.3 g/dL    Albumin 4.0 3.5 - 5.2 g/dL    Bilirubin Total 0.4 <=1.2 mg/dL    Alkaline Phosphatase 53 40 - 150 U/L    AST 20 0 - 45 U/L    ALT 18 0 - 50 U/L    Bilirubin Direct 0.13 0.00 - 0.30 mg/dL   Acetaminophen level   Result Value Ref Range    Acetaminophen <5.0 (L) 10.0 - 30.0 ug/mL   Salicylate level   Result Value Ref Range    Salicylate <0.3   mg/dL   Influenza A/B, RSV and SARS-CoV2 PCR (COVID-19) Nasopharyngeal    Specimen: Nasopharyngeal; Swab   Result Value Ref Range    Influenza A PCR Negative Negative    Influenza B PCR Negative Negative    RSV PCR Negative Negative    SARS CoV2 PCR Negative Negative   CBC with platelets and differential   Result Value Ref Range    WBC Count 5.5 4.0 - 11.0 10e3/uL    RBC Count 4.23 3.80 - 5.20 10e6/uL    Hemoglobin 12.6 11.7 - 15.7 g/dL    Hematocrit 37.4 35.0 - 47.0 %    MCV 88 78 - 100 fL    MCH 29.8 26.5 - 33.0 pg    MCHC 33.7 31.5 - 36.5 g/dL    RDW 12.1 10.0 - 15.0 %    Platelet Count 272 150 - 450 10e3/uL    % Neutrophils 51 %    % Lymphocytes 39 %    % Monocytes 9 %    % Eosinophils 1 %    % Basophils 0 %    % Immature Granulocytes 0 %    NRBCs per 100 WBC 0 <1 /100    Absolute Neutrophils 2.8 1.6 - 8.3 10e3/uL    Absolute Lymphocytes 2.2 0.8 - 5.3 10e3/uL    Absolute Monocytes 0.5 0.0 - 1.3 10e3/uL     Absolute Eosinophils 0.0 0.0 - 0.7 10e3/uL    Absolute Basophils 0.0 0.0 - 0.2 10e3/uL    Absolute Immature Granulocytes 0.0 <=0.4 10e3/uL    Absolute NRBCs 0.0 10e3/uL        Kenn Pedro MD  03/05/25 0868

## 2025-03-04 NOTE — ED NOTES
Took lower extremity restraints off to clean up pt from having incontinent episode while in restraints.  Applied a brief and behavioral scrub pants.  Pt was cooperative with cleaning.  Attempted a straight cath for urine without success.  No urine return.  Pt already emptied bladder.

## 2025-03-05 LAB
ALBUMIN UR-MCNC: 30 MG/DL
AMPHETAMINES UR QL SCN: ABNORMAL
APPEARANCE UR: ABNORMAL
BACTERIA #/AREA URNS HPF: ABNORMAL /HPF
BARBITURATES UR QL SCN: ABNORMAL
BENZODIAZ UR QL SCN: ABNORMAL
BILIRUB UR QL STRIP: NEGATIVE
BZE UR QL SCN: ABNORMAL
CANNABINOIDS UR QL SCN: ABNORMAL
COLOR UR AUTO: YELLOW
FENTANYL UR QL: ABNORMAL
GLUCOSE BLDC GLUCOMTR-MCNC: 102 MG/DL (ref 70–99)
GLUCOSE UR STRIP-MCNC: NEGATIVE MG/DL
HGB UR QL STRIP: ABNORMAL
KETONES UR STRIP-MCNC: NEGATIVE MG/DL
LEUKOCYTE ESTERASE UR QL STRIP: ABNORMAL
MUCOUS THREADS #/AREA URNS LPF: PRESENT /LPF
NITRATE UR QL: NEGATIVE
OPIATES UR QL SCN: ABNORMAL
PCP QUAL URINE (ROCHE): ABNORMAL
PH UR STRIP: 6.5 [PH] (ref 5–7)
RBC URINE: 1 /HPF
SP GR UR STRIP: 1.02 (ref 1–1.03)
SQUAMOUS EPITHELIAL: 1 /HPF
UROBILINOGEN UR STRIP-MCNC: <2 MG/DL
WBC URINE: 6 /HPF

## 2025-03-05 PROCEDURE — 82962 GLUCOSE BLOOD TEST: CPT

## 2025-03-05 PROCEDURE — 81001 URINALYSIS AUTO W/SCOPE: CPT | Performed by: STUDENT IN AN ORGANIZED HEALTH CARE EDUCATION/TRAINING PROGRAM

## 2025-03-05 PROCEDURE — 80307 DRUG TEST PRSMV CHEM ANLYZR: CPT | Performed by: STUDENT IN AN ORGANIZED HEALTH CARE EDUCATION/TRAINING PROGRAM

## 2025-03-05 ASSESSMENT — ACTIVITIES OF DAILY LIVING (ADL)
ADLS_ACUITY_SCORE: 47
ADLS_ACUITY_SCORE: 47
ADLS_ACUITY_SCORE: 41
ADLS_ACUITY_SCORE: 47
ADLS_ACUITY_SCORE: 41
ADLS_ACUITY_SCORE: 47
ADLS_ACUITY_SCORE: 41
ADLS_ACUITY_SCORE: 41
ADLS_ACUITY_SCORE: 47

## 2025-03-05 NOTE — ED NOTES
Pt sleeping. 1:1 at bedside. Pt not fighting against restraints or yelling. Awaken to voice; explained to pt regarding restraints. Pt nods her head.    Writer updated Dr. Pedro regarding restraints and pt being cooperative currently; mostly sleeping. Dr. Pedro ok to discontinue restraints for now. 1:1 at bedside.    Restraints taken off the pt.

## 2025-03-05 NOTE — ED NOTES
Per Dr. Lund, pt is holdable as we are not able to assess her at this time. Will restart 1:1 continuous monitoring.

## 2025-03-05 NOTE — ED NOTES
Pt belongings in 1 bag. Placed in closet of pt's room and locked with security key; per security pt's clothes is wet with urine and unable to place in locker room with other pt belongings/contamination.

## 2025-03-05 NOTE — ED NOTES
EMERGENCY DEPARTMENT SIGN OUT NOTE        ED COURSE AND MEDICAL DECISION MAKING  Patient was signed out to me by Dr Kenn Pedro at 2330 on 3/4    In brief, Katrina Ward is a 29 year old female who initially presented with agitation, altered mental status, requiring code BOBBY and chemical sedation. Patient has hx of methamphetamine abuse.     At time of sign out, disposition was pending re-assessment when awake, DEC consult unless patient is back to normal and wishes to go home and sxs can be attributed to methampetamine.    7:05 AM re-assessed patient, who has been sleeping all night. She continues to sleep. When awoken, she turns over and does not answer any questions. Will sign out to oncoming provider with same plan outlined by Dr. Pedro.    FINAL IMPRESSION    1. Acute psychosis (H)    2. Agitation        ED MEDS  Medications   haloperidol lactate (HALDOL) injection 5 mg (5 mg Intramuscular $Given 3/4/25 1656)     And   LORazepam (ATIVAN) injection 2 mg (2 mg Intramuscular $Given 3/4/25 1655)     And   diphenhydrAMINE (BENADRYL) injection 50 mg (50 mg Intramuscular $Given 3/4/25 165)   haloperidol (HALDOL) tablet 5 mg (has no administration in time range)     And   LORazepam (ATIVAN) tablet 2 mg (has no administration in time range)     And   diphenhydrAMINE (BENADRYL) capsule 50 mg (has no administration in time range)       LAB  Labs Ordered and Resulted from Time of ED Arrival to Time of ED Departure   BASIC METABOLIC PANEL - Abnormal       Result Value    Sodium 140      Potassium 3.1 (*)     Chloride 105      Carbon Dioxide (CO2) 25      Anion Gap 10      Urea Nitrogen 5.7 (*)     Creatinine 0.70      GFR Estimate >90      Calcium 8.8      Glucose 91     ACETAMINOPHEN LEVEL - Abnormal    Acetaminophen <5.0 (*)    HCG QUALITATIVE PREGNANCY - Normal    hCG Serum Qualitative Negative     HEPATIC FUNCTION PANEL - Normal    Protein Total 6.6      Albumin 4.0      Bilirubin Total 0.4      Alkaline  Phosphatase 53      AST 20      ALT 18      Bilirubin Direct 0.13     SALICYLATE LEVEL - Normal    Salicylate <0.3     INFLUENZA A/B, RSV AND SARS-COV2 PCR - Normal    Influenza A PCR Negative      Influenza B PCR Negative      RSV PCR Negative      SARS CoV2 PCR Negative     CBC WITH PLATELETS AND DIFFERENTIAL    WBC Count 5.5      RBC Count 4.23      Hemoglobin 12.6      Hematocrit 37.4      MCV 88      MCH 29.8      MCHC 33.7      RDW 12.1      Platelet Count 272      % Neutrophils 51      % Lymphocytes 39      % Monocytes 9      % Eosinophils 1      % Basophils 0      % Immature Granulocytes 0      NRBCs per 100 WBC 0      Absolute Neutrophils 2.8      Absolute Lymphocytes 2.2      Absolute Monocytes 0.5      Absolute Eosinophils 0.0      Absolute Basophils 0.0      Absolute Immature Granulocytes 0.0      Absolute NRBCs 0.0     ROUTINE UA WITH MICROSCOPIC REFLEX TO CULTURE   URINE DRUG SCREEN PANEL       EKG  None    RADIOLOGY    No orders to display       DISCHARGE MEDS  New Prescriptions    No medications on file       Basim Ruiz MD  [facility] EMERGENCY DEPARTMENT  [facility] [facility] [id number] [phone]         Basim Ruiz MD  03/05/25 0706

## 2025-03-05 NOTE — ED PROVIDER NOTES
[facility] EMERGENCY DEPARTMENT   ED Mental Health Observation - Daily Note for 3/5/2025    Katrina Ward is a 29 year old female currently boarding in the ED while awaiting placement for Psychosis.  Please see the initial H&P for this patient's presentation, workup, and disposition plan.     Hold Status:  Patient is Voluntary, but holdable    Plan:  In brief, the patient's presentation is notable for agitation and abnormal behavior, delusions.   Patient is awaiting DEC assessment / recommendations    Interim History:  There were no significant events since last note.    Physical Exam:  /69   Pulse 85   Temp 98  F (36.7  C) (Oral)   Resp 16   SpO2 98%   No respiratory distress, on room air   Well perfused  Behavior appropriate    Medications provided prior to my care:  Medications   haloperidol lactate (HALDOL) injection 5 mg (5 mg Intramuscular $Given 3/4/25 1656)     And   LORazepam (ATIVAN) injection 2 mg (2 mg Intramuscular $Given 3/4/25 1655)     And   diphenhydrAMINE (BENADRYL) injection 50 mg (50 mg Intramuscular $Given 3/4/25 1657)   haloperidol (HALDOL) tablet 5 mg (has no administration in time range)     And   LORazepam (ATIVAN) tablet 2 mg (has no administration in time range)     And   diphenhydrAMINE (BENADRYL) capsule 50 mg (has no administration in time range)       Laboratory (reviewed and interpreted):  Labs Ordered and Resulted from Time of ED Arrival to Time of ED Departure   BASIC METABOLIC PANEL - Abnormal       Result Value    Sodium 140      Potassium 3.1 (*)     Chloride 105      Carbon Dioxide (CO2) 25      Anion Gap 10      Urea Nitrogen 5.7 (*)     Creatinine 0.70      GFR Estimate >90      Calcium 8.8      Glucose 91     ACETAMINOPHEN LEVEL - Abnormal    Acetaminophen <5.0 (*)    HCG QUALITATIVE PREGNANCY - Normal    hCG Serum Qualitative Negative     HEPATIC FUNCTION PANEL - Normal    Protein Total 6.6      Albumin 4.0      Bilirubin Total 0.4       Alkaline Phosphatase 53      AST 20      ALT 18      Bilirubin Direct 0.13     SALICYLATE LEVEL - Normal    Salicylate <0.3     INFLUENZA A/B, RSV AND SARS-COV2 PCR - Normal    Influenza A PCR Negative      Influenza B PCR Negative      RSV PCR Negative      SARS CoV2 PCR Negative     CBC WITH PLATELETS AND DIFFERENTIAL    WBC Count 5.5      RBC Count 4.23      Hemoglobin 12.6      Hematocrit 37.4      MCV 88      MCH 29.8      MCHC 33.7      RDW 12.1      Platelet Count 272      % Neutrophils 51      % Lymphocytes 39      % Monocytes 9      % Eosinophils 1      % Basophils 0      % Immature Granulocytes 0      NRBCs per 100 WBC 0      Absolute Neutrophils 2.8      Absolute Lymphocytes 2.2      Absolute Monocytes 0.5      Absolute Eosinophils 0.0      Absolute Basophils 0.0      Absolute Immature Granulocytes 0.0      Absolute NRBCs 0.0     ROUTINE UA WITH MICROSCOPIC REFLEX TO CULTURE   URINE DRUG SCREEN PANEL       ED Course:  7:08 AM  I met with the patient and discussed plan with care team.  Patient still quite sleepy, will allow to rest a little bit longer and then attempt further assessment.  11:18 AM  Patient rechecked, sleeping but opened her eyes when I walked in the room then closed them again lose.  Subsequently wakes to sternal rub, however does not participate in conversation  3:00 PM patient up and alert, symptoms improved.  Denies SI/HI.  DEC assessment pending    Impression/Plan:  1. Acute psychosis (H)    2. Agitation        MD Kevon Chakraborty Brian T, MD  03/06/25 0615

## 2025-03-05 NOTE — ED PROVIDER NOTES
[facility] EMERGENCY DEPARTMENT   ED Mental Health Observation - Initiation Note    Katrina Ward was placed into observation at 5:20 PM on 3/4/2025 for Psychosis.   Patient is expected to be under observation status for a minimum of eight hours.    MD Willis Ramon Michael, MD  03/04/25 1301

## 2025-03-06 VITALS
DIASTOLIC BLOOD PRESSURE: 71 MMHG | OXYGEN SATURATION: 99 % | TEMPERATURE: 97.8 F | HEART RATE: 67 BPM | SYSTOLIC BLOOD PRESSURE: 117 MMHG | RESPIRATION RATE: 15 BRPM

## 2025-03-06 ASSESSMENT — ACTIVITIES OF DAILY LIVING (ADL)
ADLS_ACUITY_SCORE: 47
ADLS_ACUITY_SCORE: 47
ADLS_ACUITY_SCORE: 50
ADLS_ACUITY_SCORE: 47
ADLS_ACUITY_SCORE: 50
ADLS_ACUITY_SCORE: 47
ADLS_ACUITY_SCORE: 50
ADLS_ACUITY_SCORE: 47
ADLS_ACUITY_SCORE: 50

## 2025-03-06 NOTE — ED PROVIDER NOTES
[facility] EMERGENCY DEPARTMENT   ED Mental Health Observation - Discharge Note (Brief)    Katrina Ward is boarding in the ED after undergoing evaluation for Psychosis and Chemical Dependency  Please see the daily progress note for this patient's presentation, physical examination, and work up.    Upon reevaluation and discussion with DEC , we believe patient has shown sufficient improvement and thus will be Discharged.    Patient had been boarding in the emergency department after initial presentation with psychosis and agitation.  Patient has been resting for the last 30+ hours without incident.  She was finally able to wake up this morning and seems to be no longer exhibiting any evidence of psychosis.  She has been eating and drinking.  She met with crisis , appreciate consult note.  Patient's mother has understandable concern about ongoing substance use.  They did try to reach out to some outpatient options for chemical dependency treatment but did not have luck getting the patient placed anywhere this morning.  They were given additional resources for rule 25 assessments which I think would be the next step in her evaluation.  Patient is not holdable and is eager to discharge.  She is going to go back to her residence.  She was discharged in stable condition.        EMERGENCY DEPARTMENT OBSERVATION status ended at 11:00 AM 3/6/2025    Discharge Management Time: < 30 minutes    1. Acute psychosis (H)    2. Agitation        MD Cale Marcelo Jeffrey, MD  03/06/25 7034

## 2025-03-06 NOTE — PROGRESS NOTES
Introduced self to patient.  Informed her that I let her sleep in and now she needs to stay awake for the day.DEC would be calling soon and when they were complete I would have NA walk her to bathroom to brush teeth and wash up.  Pt stated ok.  Pt then asked where she lives and mother answered specifically. Pt kept looking at her mother like she was looking for more specific answer. DEC then called.

## 2025-03-06 NOTE — ED NOTES
3/4/2025  Katrina Ward 1995     Writer consulted with ED provider. It was determined that pt would not benefit from assessment at this time due to ED wanting patient to continue sleeping.     ED stated they will call DEC at [phone] when pt is ready and able to participate in assessment.       Alicia Kim
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
## 2025-03-06 NOTE — PLAN OF CARE
Katrina Ward  March 6, 2025  Plan of Care Hand-off Note     Patient Recommended Care Path: discharge    Clinical Substantiation:  After therapeutic assessment, intervention, and aftercare planning by ED care team and LMHP and in consultation with attending provider, the patient's circumstances and mental state were appropriate for outpatient management. It is the recommendation of this clinician that pt discharge with OP MH support. Currently the pt is not presenting as an acute risk to self or others due to the following factors: Pt denies any thoughts, intent, plans to harm herself or others. Pt was cooperative for the assessment and acknowledges recent substance use that could be attributing to her current symptoms. Pt has a desire for sobriety and is agreeable to completing a STEFANY assessment to enter treatment.    Goals for crisis stabilization:  Pt's mother states she would like to work to get pt into STEFANY treatment and will work with her to make this happen. Pt is agreeable to attending STEFANY treatment currently and states she would like to get sober at this time. She feels her mother is supportive of her as is her significant other.    Next steps for Care Team:  Print safety plan for pt. Marimar is currently calling treatment centers for placement availability and STEFANY assessment options.    Treatment Objectives Addressed:  rapport building, processing feelings, safety planning, identifying an appropriate aftercare plan, assessing safety    Therapeutic Interventions:  Engaged in safety planning    Has a specific means been identified for suicidal.homicide actions: No    Patient coping skills attempted to reduce the crisis:  Drawing, spending time with mom and attendng meetings with her. Keeping a routine.     Collateral contact information:  Marimar- (mother) [phone]    Legal Status: Voluntary/Patient has signed consent for treatment                                                  Reviewed court  records: yes     Psychiatry Consult: Not currently    Stephanie Gunn, [facility] Care[facility]

## 2025-03-06 NOTE — DISCHARGE INSTRUCTIONS
You were seen in the emergency department for confusion and hallucinations  related to methamphetamine use.  please avoid alcohol and drugs as they will cause recurrent symptoms.  You can contact some of the resources below for rule 25 assessments and continue with outpatient    The following facilities offer Rule 25/chemical health assessments:          [facility]  [phone]  Mon-Friday: [address] [facility], [address]  Saturday:  [address] [facility], [address]  M-F assessments (7a-1:45p); Saturday assessments (7:45-10:45a)          [facility]  [phone]  [address] [place], [place], [address]  *by appointment only M-W; walk ins available Fridays from 10-2.          [facility]  [phone] (phone consultation available 24/7)  In-person Assessments: [address] [facility], [facility] [address] [address]  [address] [address] [place] [address]  [address] [place] [address]  [address] [place] [address]          [facility]  [phone]  [address] [facility],  [place], [place], [address]  *walk in and appointments available by calling          [facility]  [phone]  [address] [place], [address]  *by appointment only M-Th           [facility]  [phone]  [address] [place], [place], [address]  *walk ins available M-F          [facility]  [phone] [address] [place], [place], [address]  *available by appointments only          [facility]  [phone]  [address] [place], [place], [address]  *available by appointment only          [facility] ([facility])  [phone]  [address] [place] [address], [address]  *Walk in assessments also available M-F starting at 8 am.     [facility]  [phone]  [address] [place], [place], [address]  *available by appointments      [facility]  [phone]  [address] [facility], [place], [address]  *walk in assessments available M-F starting at 7 am.     [facility]  [phone]  [facility]  [address], [place], [address]  *Walk in assessments availble M-F starting at 8 am OR [phone]     [facility]  [address] , [place] [address]  *Walk in assessments available M-F starting at 8 am     [facility]  [phone]  [address] [place] [address]     [facility]  [phone]  [address] [place], [place], [address]  *available by appointment only     [facility]  [phone]  [address] [place], [place], [address]     [facility]  [phone]  [address] [facility], [place], [address]  *available by appointment     [facility]  [phone]  [address], [place], [address]  *walk ins available M-TH from 9-3     [facility]  [phone]  [address], [place], [address]       [facility]  [phone]  [address] [place] [address]  [address] [address], [place] [address]     [facility] (Does Rule 25 Assessments)  [email or web address]  [phone]  [address] [place] [address], [address], [place] [address]     [facility]  [email or web address]  [phone], [address], [place] [address]  [phone], [address], [place], [place] [address] [phone], [address] [facility], [address], [place], [place] [address] [phone], [address] [facility] , [address], ([facility]), [place] [address] [phone], [address] [address] [place] [address], [place] [address]

## 2025-03-06 NOTE — ED PROVIDER NOTES
[facility] EMERGENCY DEPARTMENT   ED Mental Health Observation - Daily Note for 3/6/2025    Katrina Ward is a 29 year old female currently boarding in the ED while awaiting placement for Psychosis and Chemical Dependency.  Please see the initial H&P for this patient's presentation, workup, and disposition plan.     Hold Status:  Patient is Voluntary, but holdable until evaluated    Plan:  In brief, the patient's presentation is notable for agitation in the setting of history of methamephetamine abuse. Received sedation protocol on arrival. Now sleeping for a prolonged period and not yet participated in evaluation.   Patient is awaiting DEC assessment / recommendations    Interim History:  There were no significant events since last note.    Physical Exam:  /74   Pulse 95   Temp 98.1  F (36.7  C) (Oral)   Resp 16   SpO2 98%   No respiratory distress, on room air   Well perfused  Behavior appropriate    Medications provided prior to my care:  Medications   haloperidol lactate (HALDOL) injection 5 mg (5 mg Intramuscular $Given 3/4/25 1656)     And   LORazepam (ATIVAN) injection 2 mg (2 mg Intramuscular $Given 3/4/25 1655)     And   diphenhydrAMINE (BENADRYL) injection 50 mg (50 mg Intramuscular $Given 3/4/25 1657)   haloperidol (HALDOL) tablet 5 mg (has no administration in time range)     And   LORazepam (ATIVAN) tablet 2 mg (has no administration in time range)     And   diphenhydrAMINE (BENADRYL) capsule 50 mg (has no administration in time range)       Laboratory (reviewed and interpreted):  Labs Ordered and Resulted from Time of ED Arrival to Time of ED Departure   ROUTINE UA WITH MICROSCOPIC REFLEX TO CULTURE - Abnormal       Result Value    Color Urine Yellow      Appearance Urine Turbid (*)     Glucose Urine Negative      Bilirubin Urine Negative      Ketones Urine Negative      Specific Gravity Urine 1.025      Blood Urine 0.5 mg/dL (*)     pH Urine 6.5      Protein  Albumin Urine 30 (*)     Urobilinogen Urine <2.0      Nitrite Urine Negative      Leukocyte Esterase Urine 25 Chantal/uL (*)     Bacteria Urine Few (*)     Mucus Urine Present (*)     RBC Urine 1      WBC Urine 6 (*)     Squamous Epithelials Urine 1     BASIC METABOLIC PANEL - Abnormal    Sodium 140      Potassium 3.1 (*)     Chloride 105      Carbon Dioxide (CO2) 25      Anion Gap 10      Urea Nitrogen 5.7 (*)     Creatinine 0.70      GFR Estimate >90      Calcium 8.8      Glucose 91     ACETAMINOPHEN LEVEL - Abnormal    Acetaminophen <5.0 (*)    URINE DRUG SCREEN PANEL - Abnormal    Amphetamines Urine Screen Positive (*)     Barbituates Urine Screen Negative      Benzodiazepine Urine Screen Positive (*)     Cannabinoids Urine Screen Negative      Cocaine Urine Screen Negative      Fentanyl Qual Urine Screen Negative      Opiates Urine Screen Negative      PCP Urine Screen Negative     GLUCOSE BY METER - Abnormal    GLUCOSE BY METER POCT 102 (*)    HCG QUALITATIVE PREGNANCY - Normal    hCG Serum Qualitative Negative     HEPATIC FUNCTION PANEL - Normal    Protein Total 6.6      Albumin 4.0      Bilirubin Total 0.4      Alkaline Phosphatase 53      AST 20      ALT 18      Bilirubin Direct 0.13     SALICYLATE LEVEL - Normal    Salicylate <0.3     INFLUENZA A/B, RSV AND SARS-COV2 PCR - Normal    Influenza A PCR Negative      Influenza B PCR Negative      RSV PCR Negative      SARS CoV2 PCR Negative     CBC WITH PLATELETS AND DIFFERENTIAL    WBC Count 5.5      RBC Count 4.23      Hemoglobin 12.6      Hematocrit 37.4      MCV 88      MCH 29.8      MCHC 33.7      RDW 12.1      Platelet Count 272      % Neutrophils 51      % Lymphocytes 39      % Monocytes 9      % Eosinophils 1      % Basophils 0      % Immature Granulocytes 0      NRBCs per 100 WBC 0      Absolute Neutrophils 2.8      Absolute Lymphocytes 2.2      Absolute Monocytes 0.5      Absolute Eosinophils 0.0      Absolute Basophils 0.0      Absolute Immature  Granulocytes 0.0      Absolute NRBCs 0.0         ED Course:  11:00 AM I met with the patient and discussed plan with care team.     Impression/Plan:  1. Acute psychosis (H)    2. Agitation        MD Cale Marcelo Jeffrey, MD  03/06/25 7805

## 2025-03-06 NOTE — ED NOTES
3/4/2025  Katrina R Ed 1995     Writer consulted with ED provider, Ventura Cid MD  ,  on this date at  3/5/25:6:43 PM. It was determined that pt would not benefit from assessment at this time due to Pt unable able to participate in assessment  Pt reported she was too tired and refused to engage in her DEC Assessment.  Pt was not receptive to encouragements and prompts to engage in her DEC Assessment as she went back to sleep.    ED will call DEC at [phone] when pt is ready and able to participate in assessment.     OR DEC order has been closed at this time.    GUMARO Jesus

## 2025-03-06 NOTE — ED NOTES
Woke pt up to take vitals. Pt was cooperative.  Offered pt something to eat for breakfast and if she needed to use bathroom. Pt refused said she was thirsty and wanted a sip of water then went back to sleep. Pt's mother is at bedside

## 2025-07-13 ENCOUNTER — HEALTH MAINTENANCE LETTER (OUTPATIENT)
Age: 30
End: 2025-07-13